# Patient Record
Sex: MALE | Race: WHITE | NOT HISPANIC OR LATINO | Employment: FULL TIME | URBAN - METROPOLITAN AREA
[De-identification: names, ages, dates, MRNs, and addresses within clinical notes are randomized per-mention and may not be internally consistent; named-entity substitution may affect disease eponyms.]

---

## 2019-01-21 ENCOUNTER — OFFICE VISIT (OUTPATIENT)
Dept: URGENT CARE | Facility: CLINIC | Age: 44
End: 2019-01-21
Payer: COMMERCIAL

## 2019-01-21 VITALS
HEIGHT: 74 IN | TEMPERATURE: 97 F | RESPIRATION RATE: 14 BRPM | WEIGHT: 239 LBS | BODY MASS INDEX: 30.67 KG/M2 | SYSTOLIC BLOOD PRESSURE: 142 MMHG | DIASTOLIC BLOOD PRESSURE: 99 MMHG | HEART RATE: 83 BPM | OXYGEN SATURATION: 100 %

## 2019-01-21 DIAGNOSIS — R53.83 FATIGUE, UNSPECIFIED TYPE: Primary | ICD-10-CM

## 2019-01-21 LAB
CTP QC/QA: YES
FLUAV AG NPH QL: NEGATIVE
FLUBV AG NPH QL: NEGATIVE

## 2019-01-21 PROCEDURE — 87804 POCT INFLUENZA A/B: ICD-10-PCS | Mod: 59,QW,, | Performed by: NURSE PRACTITIONER

## 2019-01-21 PROCEDURE — 99204 OFFICE O/P NEW MOD 45 MIN: CPT | Mod: 25,S$GLB,, | Performed by: NURSE PRACTITIONER

## 2019-01-21 PROCEDURE — 99204 PR OFFICE/OUTPT VISIT, NEW, LEVL IV, 45-59 MIN: ICD-10-PCS | Mod: 25,S$GLB,, | Performed by: NURSE PRACTITIONER

## 2019-01-21 PROCEDURE — 96372 THER/PROPH/DIAG INJ SC/IM: CPT | Mod: S$GLB,,, | Performed by: NURSE PRACTITIONER

## 2019-01-21 PROCEDURE — 87804 INFLUENZA ASSAY W/OPTIC: CPT | Mod: QW,,, | Performed by: NURSE PRACTITIONER

## 2019-01-21 PROCEDURE — 96372 PR INJECTION,THERAP/PROPH/DIAG2ST, IM OR SUBCUT: ICD-10-PCS | Mod: S$GLB,,, | Performed by: NURSE PRACTITIONER

## 2019-01-21 RX ORDER — CETIRIZINE HYDROCHLORIDE 10 MG/1
10 TABLET ORAL DAILY
Qty: 30 TABLET | Refills: 2 | Status: SHIPPED | OUTPATIENT
Start: 2019-01-21 | End: 2020-09-09

## 2019-01-21 RX ORDER — FLUTICASONE PROPIONATE 50 MCG
2 SPRAY, SUSPENSION (ML) NASAL 2 TIMES DAILY
Qty: 1 BOTTLE | Refills: 0 | Status: SHIPPED | OUTPATIENT
Start: 2019-01-21 | End: 2019-03-06

## 2019-01-21 RX ORDER — DEXAMETHASONE SODIUM PHOSPHATE 4 MG/ML
8 INJECTION, SOLUTION INTRA-ARTICULAR; INTRALESIONAL; INTRAMUSCULAR; INTRAVENOUS; SOFT TISSUE
Status: COMPLETED | OUTPATIENT
Start: 2019-01-21 | End: 2019-01-21

## 2019-01-21 RX ADMIN — DEXAMETHASONE SODIUM PHOSPHATE 8 MG: 4 INJECTION, SOLUTION INTRA-ARTICULAR; INTRALESIONAL; INTRAMUSCULAR; INTRAVENOUS; SOFT TISSUE at 05:01

## 2019-01-21 NOTE — PATIENT INSTRUCTIONS
Symptomatic treatment:    Alternate Tylenol and Ibuprofen every 3 hrs  salt water gargles to soothe throat  Honey/lemon water to soothe throat  Cold-eeze helps to reduce the duration of URI symptoms  Elderberry to reduce duration of URI symptoms  Cepachol helps to numb the discomfort in throat  Nasal saline spray reduces inflammation and dryness  Warm face compresses/hot showers as often as you can to open sinuses   Vicks vapor rub at night  Flonase OTC or Nasacort OTC  Simple foods like chicken noodle soup help hydrate  Delsym helps with coughing at night  Zyrtec/Claritin during the day and Benadryl at night may help if allergy component   Zantac will help if there is reflux from the post nasal drip  Rest as much as you can  Your symptoms will likely last 5-7 days, maybe longer depending on how it affects your body.  You are contagious 5-7, so minimize contact with others to reduce the spread to others and stay home from work or school as we discussed. Dehydration is preventable but is one of the main reasons why you will feel so badly. Drink pedialyte, gatorade or propel. Stay hydrated.  Antibiotics are not needed unless a complication(such as Otitis Media, Bacterial sinus infection or pneumonia). Taking antibiotics for Flu/Cold is not supported by evidencebased medicine and can expose you to unnecessary side effects of the medication, such as anaphylaxis.   If you experience any:  Chest pain, shortness of breath, wheezing or difficulty breathing  Severe headache, face, neck or ear pain  New rash  Fever over 101.5º F (38.6 C) for more than three days  Confusion, behavior change or seizure  Severe weakness or dizziness  Go to ER        Managing Fatigue     Family members can help with meals and chores around the house.   Fatigue is common. It can be caused by worry, lack of sleep, or poor appetite. Fatigue can also be a sign of anemia, a shortage of red blood cells. You might need medical treatment for anemia. The  tips below can help you feel better.  Conserving energy  · Keep track of the times of day when you are most tired and plan around them. For instance, if you are more tired in the afternoon, try to get tasks done in the morning.  · Decide which tasks are most important. Do those first.  · Pass tasks along to others when you need to. Ask for help.  · Accept help when its offered. Tell people what they can do to help. For instance, you may need someone to fix a meal, fold clothes, or put gas in your car.  · Plan rest times. You may want to take a nap each day. Just sitting quietly for a few minutes can make you feel more rested.  What you can do to feel better  · Relax before you try to sleep. Take a bath or read for a while.  · Form a sleep pattern. Go to bed at the same time each night and get up at the same time each morning.  · Eat well. Choose foods from all of the food groups each day.  · Exercise. Take a brisk walk to help increase your energy.  · Avoid caffeine and alcohol. Drink plenty of water or fruit juices instead.  Treating anemia  If you begin to feel more tired than normal, tell your doctor. Fatigue could be a sign of anemia. This problem is fairly common in cancer patients, especially during chemotherapy and radiation treatments. If your red blood cell count is too low, you may get a blood transfusion. In some cases, you may need medicine to increase the number of red blood cells your body makes.  When to call your healthcare provider  Call your healthcare provider if you have:  · Shortness of breath or chest pain  · A dizzy feeling when you get up from lying or sitting down  · Paler skin than normal  · Extreme tiredness that is not helped by sleep   Date Last Reviewed: 1/3/2016  © 4882-9665 [employer]. [address] [place] [address], [place] [address]. All rights reserved. This information is not intended as a substitute for professional medical care. Always follow your healthcare  professional's instructions.        Viral Upper Respiratory Illness (Adult)  You have a viral upper respiratory illness (URI), which is another term for the common cold. This illness is contagious during the first few days. It is spread through the air by coughing and sneezing. It may also be spread by direct contact (touching the sick person and then touching your own eyes, nose, or mouth). Frequent handwashing will decrease risk of spread. Most viral illnesses go away within 7 to 10 days with rest and simple home remedies. Sometimes the illness may last for several weeks. Antibiotics will not kill a virus, and they are generally not prescribed for this condition.    Home care  · If symptoms are severe, rest at home for the first 2 to 3 days. When you resume activity, don't let yourself get too tired.  · Avoid being exposed to cigarette smoke (yours or others).  · You may use acetaminophen or ibuprofen to control pain and fever, unless another medicine was prescribed. (Note: If you have chronic liver or kidney disease, have ever had a stomach ulcer or gastrointestinal bleeding, or are taking blood-thinning medicines, talk with your healthcare provider before using these medicines.) Aspirin should never be given to anyone under 18 years of age who is ill with a viral infection or fever. It may cause severe liver or brain damage.  · Your appetite may be poor, so a light diet is fine. Avoid dehydration by drinking 6 to 8 glasses of fluids per day (water, soft drinks, juices, tea, or soup). Extra fluids will help loosen secretions in the nose and lungs.  · Over-the-counter cold medicines will not shorten the length of time youre sick, but they may be helpful for the following symptoms: cough, sore throat, and nasal and sinus congestion. (Note: Do not use decongestants if you have high blood pressure.)  Follow-up care  Follow up with your healthcare provider, or as advised.  When to seek medical advice  Call your  healthcare provider right away if any of these occur:  · Cough with lots of colored sputum (mucus)  · Severe headache; face, neck, or ear pain  · Difficulty swallowing due to throat pain  · Fever of 100.4°F (38°C)  Call 911, or get immediate medical care  Call emergency services right away if any of these occur:  · Chest pain, shortness of breath, wheezing, or difficulty breathing  · Coughing up blood  · Inability to swallow due to throat pain  Date Last Reviewed: 9/13/2015 © 2000-2017 [employer]. [address] [place] [id number]. All rights reserved. This information is not intended as a substitute for professional medical care. Always follow your healthcare professional's instructions.

## 2019-01-21 NOTE — PROGRESS NOTES
"Subjective:       Patient ID: Lucas Figueroa is a 43 y.o. male.    Vitals:  height is 6' 2" (1.88 m) and weight is 108.4 kg (239 lb). His temperature is 97.2 °F (36.2 °C). His blood pressure is 142/99 (abnormal) and his pulse is 83. His respiration is 14 and oxygen saturation is 100%.     Chief Complaint: Fatigue    Fatigue   The current episode started yesterday. Associated symptoms include congestion, coughing, fatigue and headaches. Pertinent negatives include no arthralgias, chest pain, chills, fever, joint swelling, myalgias, nausea, rash, sore throat, vertigo or vomiting. Associated symptoms comments: Sinus pressure , nausea, loss of appeitie .       Constitution: Positive for fatigue. Negative for chills and fever.   HENT: Positive for congestion. Negative for sore throat.    Neck: Negative for painful lymph nodes.   Cardiovascular: Negative for chest pain and leg swelling.   Eyes: Negative for double vision and blurred vision.   Respiratory: Positive for cough. Negative for shortness of breath.    Gastrointestinal: Negative for nausea, vomiting and diarrhea.   Genitourinary: Negative for dysuria, frequency and urgency.   Musculoskeletal: Negative for joint pain, joint swelling, muscle cramps and muscle ache.   Skin: Negative for color change, pale and rash.   Allergic/Immunologic: Negative for seasonal allergies.   Neurological: Positive for headaches. Negative for dizziness, history of vertigo, light-headedness and passing out.   Hematologic/Lymphatic: Negative for swollen lymph nodes, easy bruising/bleeding and history of blood clots. Does not bruise/bleed easily.   Psychiatric/Behavioral: Negative for nervous/anxious, sleep disturbance and depression. The patient is not nervous/anxious.        Objective:      Physical Exam   Constitutional: He is oriented to person, place, and time. He appears well-developed and well-nourished. He is active and cooperative.   HENT:   Head: Normocephalic and " atraumatic.   Right Ear: Hearing, external ear and ear canal normal. A middle ear effusion is present.   Left Ear: Hearing, external ear and ear canal normal. A middle ear effusion is present.   Nose: Mucosal edema and rhinorrhea present. Right sinus exhibits maxillary sinus tenderness. Left sinus exhibits maxillary sinus tenderness.   Mouth/Throat: Uvula is midline and mucous membranes are normal. Posterior oropharyngeal erythema present.   Eyes: Conjunctivae, EOM and lids are normal. Pupils are equal, round, and reactive to light.   Neck: Trachea normal, normal range of motion and phonation normal. Neck supple.   Cardiovascular: Normal rate, regular rhythm, normal heart sounds, intact distal pulses and normal pulses.   Pulmonary/Chest: Effort normal and breath sounds normal.   Abdominal: Soft. Bowel sounds are normal.   Musculoskeletal: Normal range of motion.   Neurological: He is alert and oriented to person, place, and time. He has normal strength. GCS eye subscore is 4. GCS verbal subscore is 5. GCS motor subscore is 6.   Skin: Skin is warm, dry and intact.   Psychiatric: He has a normal mood and affect. His behavior is normal. Judgment and thought content normal.   Nursing note and vitals reviewed.      Assessment:       1. Fatigue, unspecified type        Plan:     negative influenza, but with symptoms, will treat for flu    Fatigue, unspecified type  -     POCT Influenza A/B    Other orders  -     dexamethasone injection 8 mg  -     cetirizine (ZYRTEC) 10 MG tablet; Take 1 tablet (10 mg total) by mouth once daily.  Dispense: 30 tablet; Refill: 2  -     fluticasone (FLONASE) 50 mcg/actuation nasal spray; 2 sprays (100 mcg total) by Each Nare route 2 (two) times daily.  Dispense: 1 Bottle; Refill: 0  -     baloxavir marboxil (XOFLUZA) 40 mg Tab; Take 80 mg by mouth once. for 1 dose  Dispense: 2 tablet; Refill: 0

## 2019-02-04 ENCOUNTER — OFFICE VISIT (OUTPATIENT)
Dept: ORTHOPEDICS | Facility: CLINIC | Age: 44
End: 2019-02-04
Payer: COMMERCIAL

## 2019-02-04 VITALS
BODY MASS INDEX: 29.52 KG/M2 | HEIGHT: 74 IN | WEIGHT: 230 LBS | HEART RATE: 80 BPM | DIASTOLIC BLOOD PRESSURE: 86 MMHG | SYSTOLIC BLOOD PRESSURE: 140 MMHG

## 2019-02-04 DIAGNOSIS — M79.631 RIGHT FOREARM PAIN: Primary | ICD-10-CM

## 2019-02-04 DIAGNOSIS — R29.898 WEAKNESS OF RIGHT UPPER EXTREMITY: ICD-10-CM

## 2019-02-04 PROCEDURE — 99203 PR OFFICE/OUTPT VISIT, NEW, LEVL III, 30-44 MIN: ICD-10-PCS | Mod: ,,, | Performed by: ORTHOPAEDIC SURGERY

## 2019-02-04 PROCEDURE — 99203 OFFICE O/P NEW LOW 30 MIN: CPT | Mod: ,,, | Performed by: ORTHOPAEDIC SURGERY

## 2019-02-04 RX ORDER — LISDEXAMFETAMINE DIMESYLATE 60 MG/1
CAPSULE ORAL
Refills: 0 | COMMUNITY
Start: 2019-01-06 | End: 2019-03-06

## 2019-02-04 NOTE — PROGRESS NOTES
[facility]    Subjective:     Chief Complaint:   Chief Complaint   Patient presents with    Right Wrist - Pain     Right wrist pain x a while. States that his pain has gotten worse and states that it is weak and states that if he does certain things with it, he will get pain. Pain comes and goes.        History reviewed. No pertinent past medical history.    Past Surgical History:   Procedure Laterality Date    HERNIA REPAIR         Current Outpatient Medications   Medication Sig    cetirizine (ZYRTEC) 10 MG tablet Take 1 tablet (10 mg total) by mouth once daily.    fluticasone (FLONASE) 50 mcg/actuation nasal spray 2 sprays (100 mcg total) by Each Nare route 2 (two) times daily.    VYVANSE 60 mg capsule TK 1 C PO QAM     No current facility-administered medications for this visit.        Review of patient's allergies indicates:  No Known Allergies    Family History   Problem Relation Age of Onset    No Known Problems Mother     No Known Problems Father        Social History     Socioeconomic History    Marital status: Single     Spouse name: Not on file    Number of children: Not on file    Years of education: Not on file    Highest education level: Not on file   Social Needs    Financial resource strain: Not on file    Food insecurity - worry: Not on file    Food insecurity - inability: Not on file    Transportation needs - medical: Not on file    Transportation needs - non-medical: Not on file   Occupational History    Not on file   Tobacco Use    Smoking status: Never Smoker   Substance and Sexual Activity    Alcohol use: Not on file    Drug use: Not on file    Sexual activity: Not on file   Other Topics Concern    Not on file   Social History Narrative    Not on file       History of present illness: This man 43 cuts him in usual right forearm pain. He has some vague aching in the arm does not have particularly numbness. Does not member any particular injury. 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 Never had a fracture etc. Does not complain of typical night pain like carpal tunnel. He does note some stiffness in the right arm compared to the left. He works at a desk and with the computer mouse he notes some discomfort in his right arm in that position with the arm pronated on the mouse.      Review of Systems:    Constitution: Negative for chills, fever, and sweats.  Negative for unexplained weight loss.    HENT:  Negative for headaches and blurry vision.    Cardiovascular:Negative for chest pain or irregular heart beat. Negative for hypertension.    Respiratory:  Negative for cough and shortness of breath.    Gastrointestinal: Negative for abdominal pain, heartburn, melena, nausea, and vomitting.    Genitourinary:  Negative bladder incontinence and dysuria.    Musculoskeletal:  See HPI for details.     Neurological: Negative for numbness.    Psychiatric/Behavioral: Negative for depression.  The patient is not nervous/anxious.      Endocrine: Negative for polyuria    Hematologic/Lymphatic: Negative for bleeding problem.  Does not bruise/bleed easily.    Skin: Negative for poor would healing and rash    Objective:      Physical Examination:    Vital Signs:    Vitals:    02/04/19 0839   BP: (!) 140/86   Pulse: 80       Body mass index is 29.53 kg/m².    This a well-developed, well nourished patient in no acute distress.  They are alert and oriented and cooperative to examination.        Nuzhta look at his right forearm he has minimal tenderness at the lateral epicondyle he is no tenderness at the medial epicondyle good range of motion flexion extension the elbow he does have some changes in his rotation. He lacks about 15-20° of supination and lacks at least 30° of pronation with some discomfort in his forearm with pronation.  1:30 left 110 right. He has no tenderness at the distal radioulnar joint he has no tenderness to indicate de Quervain's and or any particular wrist issue. Such as  ganglion. He has no atrophy of his hand he has no weakness in intrinsic in his right hand.  Pertinent New Results:    XRAY Report / Interpretation:   We have AP and lateral right forearm. The radius and ulna appear to have a normal contour I do not see any ossifications or other issues in the interosseous membrane. The elbow on AP and lateral appears to be unremarkable the wrist is unremarkable.Electronically Signed By Brett Salamanca JR, MD    Assessment/Plan:      So he has some kind of unusual issue with rotation of the forearm particularly with pronation of forearm, I plan to get an MRI of his right forearm. I don't see anything in interosseous membrane. Also want to get a nerve conduction EMG to see if is any issue with the nerves in his right forearm. Meanwhile he's going to continue his normal activities. I don't have a reason for a loss of pronation on x-ray so far. Return after those tests are done      This note was created using Dragon voice recognition software that occasionally misinterpreted phrases or words.

## 2019-02-18 ENCOUNTER — TELEPHONE (OUTPATIENT)
Dept: ORTHOPEDICS | Facility: CLINIC | Age: 44
End: 2019-02-18

## 2019-02-22 NOTE — TELEPHONE ENCOUNTER
Just noticed this message was sent directly to Dr. Salamanca, and not me.  Resubmitted the order to Dr. Ortega via inbox

## 2019-02-27 ENCOUNTER — TELEPHONE (OUTPATIENT)
Dept: ORTHOPEDICS | Facility: CLINIC | Age: 44
End: 2019-02-27

## 2019-02-27 NOTE — TELEPHONE ENCOUNTER
Dr Ortega's office called because this patient is coming in at 10 AM to get a test done, she needs a demo sheet and referral/order of what we want done. Please fax to [phone].

## 2019-03-06 ENCOUNTER — OFFICE VISIT (OUTPATIENT)
Dept: ORTHOPEDICS | Facility: CLINIC | Age: 44
End: 2019-03-06
Payer: COMMERCIAL

## 2019-03-06 VITALS
BODY MASS INDEX: 29.52 KG/M2 | HEIGHT: 74 IN | SYSTOLIC BLOOD PRESSURE: 138 MMHG | WEIGHT: 230 LBS | HEART RATE: 83 BPM | DIASTOLIC BLOOD PRESSURE: 88 MMHG

## 2019-03-06 DIAGNOSIS — R29.898 WEAKNESS OF RIGHT UPPER EXTREMITY: ICD-10-CM

## 2019-03-06 DIAGNOSIS — G56.21 TARDY ULNAR NERVE PALSY, RIGHT: ICD-10-CM

## 2019-03-06 DIAGNOSIS — M79.631 RIGHT FOREARM PAIN: Primary | ICD-10-CM

## 2019-03-06 PROCEDURE — 99213 PR OFFICE/OUTPT VISIT, EST, LEVL III, 20-29 MIN: ICD-10-PCS | Mod: ,,, | Performed by: ORTHOPAEDIC SURGERY

## 2019-03-06 PROCEDURE — 99213 OFFICE O/P EST LOW 20 MIN: CPT | Mod: ,,, | Performed by: ORTHOPAEDIC SURGERY

## 2019-03-06 RX ORDER — LISDEXAMFETAMINE DIMESYLATE 50 MG/1
CAPSULE ORAL
Refills: 0 | COMMUNITY
Start: 2019-02-10 | End: 2020-09-09 | Stop reason: SDUPTHER

## 2020-08-09 ENCOUNTER — HOSPITAL ENCOUNTER (EMERGENCY)
Facility: HOSPITAL | Age: 45
Discharge: HOME | End: 2020-08-10
Attending: EMERGENCY MEDICINE
Payer: COMMERCIAL

## 2020-08-09 DIAGNOSIS — M25.539 WRIST PAIN: ICD-10-CM

## 2020-08-09 DIAGNOSIS — M79.643 HAND PAIN: ICD-10-CM

## 2020-08-09 DIAGNOSIS — L03.113 CELLULITIS OF RIGHT UPPER EXTREMITY: Primary | ICD-10-CM

## 2020-08-09 PROCEDURE — 99284 EMERGENCY DEPT VISIT MOD MDM: CPT | Mod: 25

## 2020-08-10 VITALS
HEIGHT: 74 IN | BODY MASS INDEX: 28.23 KG/M2 | WEIGHT: 220 LBS | RESPIRATION RATE: 16 BRPM | HEART RATE: 76 BPM | OXYGEN SATURATION: 100 % | SYSTOLIC BLOOD PRESSURE: 148 MMHG | DIASTOLIC BLOOD PRESSURE: 94 MMHG | TEMPERATURE: 98 F

## 2020-08-10 LAB
ALBUMIN SERPL BCP-MCNC: 4.1 G/DL (ref 3.5–5.2)
ALP SERPL-CCNC: 74 U/L (ref 55–135)
ALT SERPL W/O P-5'-P-CCNC: 18 U/L (ref 10–44)
ANION GAP SERPL CALC-SCNC: 10 MMOL/L (ref 8–16)
AST SERPL-CCNC: 18 U/L (ref 10–40)
BASOPHILS # BLD AUTO: 0.04 K/UL (ref 0–0.2)
BASOPHILS NFR BLD: 0.5 % (ref 0–1.9)
BILIRUB SERPL-MCNC: 0.9 MG/DL (ref 0.1–1)
BUN SERPL-MCNC: 20 MG/DL (ref 6–20)
CALCIUM SERPL-MCNC: 8.9 MG/DL (ref 8.7–10.5)
CHLORIDE SERPL-SCNC: 105 MMOL/L (ref 95–110)
CK SERPL-CCNC: 161 U/L (ref 20–200)
CO2 SERPL-SCNC: 23 MMOL/L (ref 23–29)
CREAT SERPL-MCNC: 1 MG/DL (ref 0.5–1.4)
DIFFERENTIAL METHOD: ABNORMAL
EOSINOPHIL # BLD AUTO: 0.4 K/UL (ref 0–0.5)
EOSINOPHIL NFR BLD: 5 % (ref 0–8)
ERYTHROCYTE [DISTWIDTH] IN BLOOD BY AUTOMATED COUNT: 13.2 % (ref 11.5–14.5)
ERYTHROCYTE [SEDIMENTATION RATE] IN BLOOD BY WESTERGREN METHOD: 28 MM/HR (ref 0–10)
EST. GFR  (AFRICAN AMERICAN): >60 ML/MIN/1.73 M^2
EST. GFR  (NON AFRICAN AMERICAN): >60 ML/MIN/1.73 M^2
GLUCOSE SERPL-MCNC: 100 MG/DL (ref 70–110)
HCT VFR BLD AUTO: 36.1 % (ref 40–54)
HGB BLD-MCNC: 12.6 G/DL (ref 14–18)
IMM GRANULOCYTES # BLD AUTO: 0.01 K/UL (ref 0–0.04)
IMM GRANULOCYTES NFR BLD AUTO: 0.1 % (ref 0–0.5)
LACTATE SERPL-SCNC: 0.9 MMOL/L (ref 0.5–1.9)
LYMPHOCYTES # BLD AUTO: 2 K/UL (ref 1–4.8)
LYMPHOCYTES NFR BLD: 26.2 % (ref 18–48)
MCH RBC QN AUTO: 30.4 PG (ref 27–31)
MCHC RBC AUTO-ENTMCNC: 34.9 G/DL (ref 32–36)
MCV RBC AUTO: 87 FL (ref 82–98)
MONOCYTES # BLD AUTO: 0.9 K/UL (ref 0.3–1)
MONOCYTES NFR BLD: 11.4 % (ref 4–15)
NEUTROPHILS # BLD AUTO: 4.3 K/UL (ref 1.8–7.7)
NEUTROPHILS NFR BLD: 56.8 % (ref 38–73)
NRBC BLD-RTO: 0 /100 WBC
PLATELET # BLD AUTO: 284 K/UL (ref 150–350)
PMV BLD AUTO: 9.3 FL (ref 9.2–12.9)
POTASSIUM SERPL-SCNC: 3.7 MMOL/L (ref 3.5–5.1)
PROT SERPL-MCNC: 7 G/DL (ref 6–8.4)
RBC # BLD AUTO: 4.14 M/UL (ref 4.6–6.2)
SODIUM SERPL-SCNC: 138 MMOL/L (ref 136–145)
WBC # BLD AUTO: 7.62 K/UL (ref 3.9–12.7)

## 2020-08-10 PROCEDURE — 85651 RBC SED RATE NONAUTOMATED: CPT

## 2020-08-10 PROCEDURE — 83605 ASSAY OF LACTIC ACID: CPT

## 2020-08-10 PROCEDURE — 82550 ASSAY OF CK (CPK): CPT

## 2020-08-10 PROCEDURE — 36415 COLL VENOUS BLD VENIPUNCTURE: CPT

## 2020-08-10 PROCEDURE — 25000003 PHARM REV CODE 250: Performed by: EMERGENCY MEDICINE

## 2020-08-10 PROCEDURE — 80053 COMPREHEN METABOLIC PANEL: CPT

## 2020-08-10 PROCEDURE — 85025 COMPLETE CBC W/AUTO DIFF WBC: CPT

## 2020-08-10 RX ORDER — DOXYCYCLINE 100 MG/1
100 CAPSULE ORAL 2 TIMES DAILY
Qty: 20 CAPSULE | Refills: 0 | Status: SHIPPED | OUTPATIENT
Start: 2020-08-10 | End: 2020-08-20

## 2020-08-10 RX ORDER — IBUPROFEN 600 MG/1
600 TABLET ORAL EVERY 6 HOURS PRN
Qty: 20 TABLET | Refills: 0 | Status: SHIPPED | OUTPATIENT
Start: 2020-08-10 | End: 2022-03-11

## 2020-08-10 RX ORDER — DOXYCYCLINE 100 MG/1
100 CAPSULE ORAL EVERY 12 HOURS
Status: DISCONTINUED | OUTPATIENT
Start: 2020-08-10 | End: 2020-08-10 | Stop reason: HOSPADM

## 2020-08-10 RX ADMIN — IBUPROFEN 600 MG: 400 TABLET ORAL at 01:08

## 2020-08-10 RX ADMIN — DOXYCYCLINE HYCLATE 100 MG: 100 CAPSULE ORAL at 01:08

## 2020-08-10 NOTE — ED PROVIDER NOTES
Encounter Date: 8/9/2020       History     Chief Complaint   Patient presents with    Wrist Injury     right     44-year-old male with no significant past medical history presents with right wrist pain and swelling.  Patient states he has had episodes like this similar in the past that have resolved in her home.  Patient denies any recent traumas and states that he decided to come to emergency room because of the increased swelling and decreased range of motion that he has been having.  Patient denies any loss of sensation, fevers, chills, sweats, nausea, vomiting, chest pain or shortness of breath.  He is otherwise stable and has no other complaints.        Review of patient's allergies indicates:  No Known Allergies  No past medical history on file.  Past Surgical History:   Procedure Laterality Date    HERNIA REPAIR       Family History   Problem Relation Age of Onset    No Known Problems Mother     No Known Problems Father      Social History     Tobacco Use    Smoking status: Never Smoker   Substance Use Topics    Alcohol use: Not on file    Drug use: Not on file     Review of Systems   Constitutional: Negative for fever.   HENT: Negative for sore throat.    Respiratory: Negative for shortness of breath.    Cardiovascular: Negative for chest pain.   Gastrointestinal: Negative for nausea.   Genitourinary: Negative for dysuria.   Musculoskeletal: Positive for joint swelling (Right wrist). Negative for back pain.   Skin: Negative for rash.   Neurological: Negative for weakness.   Hematological: Does not bruise/bleed easily.   All other systems reviewed and are negative.      Physical Exam     Initial Vitals [08/09/20 2240]   BP Pulse Resp Temp SpO2   (!) 160/106 84 16 97.6 °F (36.4 °C) 98 %      MAP       --         Physical Exam    Nursing note and vitals reviewed.  Constitutional: He appears well-developed and well-nourished. He is not diaphoretic. He appears distressed.   HENT:   Head: Normocephalic and  atraumatic.   Mouth/Throat: Oropharynx is clear and moist.   Eyes: Conjunctivae and EOM are normal. Pupils are equal, round, and reactive to light.   Neck: Normal range of motion. Neck supple. No thyromegaly present. No JVD present.   Cardiovascular: Normal rate, regular rhythm and normal heart sounds. Exam reveals no gallop and no friction rub.    No murmur heard.  Pulmonary/Chest: Breath sounds normal. No respiratory distress. He has no wheezes. He exhibits no tenderness.   Abdominal: Soft. Bowel sounds are normal. He exhibits no distension. There is no abdominal tenderness. There is no rebound and no guarding.   Musculoskeletal: Tenderness and edema present.      Right wrist: He exhibits decreased range of motion and swelling.   Neurological: He is alert and oriented to person, place, and time. He has normal strength. No cranial nerve deficit or sensory deficit.   Skin: Skin is warm and dry. Capillary refill takes less than 2 seconds. No rash noted. No erythema.   Psychiatric: He has a normal mood and affect.         ED Course   Procedures  Labs Reviewed   CBC W/ AUTO DIFFERENTIAL - Abnormal; Notable for the following components:       Result Value    RBC 4.14 (*)     Hemoglobin 12.6 (*)     Hematocrit 36.1 (*)     All other components within normal limits   CK   LACTIC ACID, PLASMA   COMPREHENSIVE METABOLIC PANEL   SEDIMENTATION RATE          Imaging Results          X-Ray Wrist Complete Right (In process)                X-Ray Hand 3 View Right (In process)  Result time 08/09/20 23:18:00   Procedure changed from X-Ray Hand 3 View Left                X-Ray Forearm Right (In process)  Result time 08/09/20 23:17:24   Procedure changed from X-Ray Forearm Left                  Medical Decision Making:   Initial Assessment:   [age] year male initial assessment in mild distress secondary to right wrist swelling and pain.  Patient is alert and oriented x3, neurologically and neurovascularly intact with no focal  deficits.  He is nontoxic appearing and is vitals are stable at this time.  Differential Diagnosis:   Cellulitis, abscess, septic joint, tenosynovitis  Independently Interpreted Test(s):   I have ordered and independently interpreted X-rays - see prior notes.  Clinical Tests:   Lab Tests: Ordered and Reviewed  The following lab test(s) were unremarkable: CBC and CMP  Radiological Study: Ordered and Reviewed  ED Management:  Patient has been reassessed noted to have no acute changes condition.  Images and inflammatory markers as well as other lab work are negative for any acute pathology requiring further hospital admission or treatment at this time.  Patient given ibuprofen as well as started on doxycycline for what appears to be in acute cellulitis of the affected area.  Patient has remained stable while in the ED we discharged home stable condition with return precautions noted.  Mr. Figueroa is aware of the plan and in agreement with discharge.                                 Clinical Impression:       ICD-10-CM ICD-9-CM   1. Cellulitis of right upper extremity  L03.113 682.3   2. Wrist pain  M25.539 719.43   3. Hand pain  M79.643 729.5             ED Disposition Condition    Discharge Stable        ED Prescriptions     Medication Sig Dispense Start Date End Date Auth. Provider    ibuprofen (ADVIL,MOTRIN) 600 MG tablet Take 1 tablet (600 mg total) by mouth every 6 (six) hours as needed. 20 tablet 8/10/2020  William Friedman MD    doxycycline (VIBRAMYCIN) 100 MG Cap Take 1 capsule (100 mg total) by mouth 2 (two) times daily. for 10 days 20 capsule 8/10/2020 8/20/2020 William Friedman MD        Follow-up Information     Follow up With Specialties Details Why Contact Info Additional Information    [facility] Emergency Medicine  As needed, For wound re-check [address] [facility] [id number] [phone] 1st floor    William Plaza MD Family Medicine Schedule an appointment as soon as  possible for a visit in 1 week For wound re-check [address] [facility]  [address]  [facility] [address]  [phone]                                        William Friedman MD  08/10/20 0144

## 2020-09-09 ENCOUNTER — OFFICE VISIT (OUTPATIENT)
Dept: FAMILY MEDICINE | Facility: CLINIC | Age: 45
End: 2020-09-09
Payer: COMMERCIAL

## 2020-09-09 VITALS
OXYGEN SATURATION: 97 % | HEIGHT: 74 IN | HEART RATE: 81 BPM | RESPIRATION RATE: 16 BRPM | BODY MASS INDEX: 29.76 KG/M2 | TEMPERATURE: 97 F | SYSTOLIC BLOOD PRESSURE: 142 MMHG | WEIGHT: 231.88 LBS | DIASTOLIC BLOOD PRESSURE: 96 MMHG

## 2020-09-09 DIAGNOSIS — Z11.4 SCREENING FOR HIV WITHOUT PRESENCE OF RISK FACTORS: ICD-10-CM

## 2020-09-09 DIAGNOSIS — Z11.59 ENCOUNTER FOR HEPATITIS C SCREENING TEST FOR LOW RISK PATIENT: ICD-10-CM

## 2020-09-09 DIAGNOSIS — Z13.220 ENCOUNTER FOR LIPID SCREENING FOR CARDIOVASCULAR DISEASE: ICD-10-CM

## 2020-09-09 DIAGNOSIS — Z13.6 ENCOUNTER FOR LIPID SCREENING FOR CARDIOVASCULAR DISEASE: ICD-10-CM

## 2020-09-09 DIAGNOSIS — Z13.1 DIABETES MELLITUS SCREENING: ICD-10-CM

## 2020-09-09 DIAGNOSIS — I10 ESSENTIAL HYPERTENSION: Primary | ICD-10-CM

## 2020-09-09 DIAGNOSIS — F98.8 ATTENTION DEFICIT DISORDER (ADD) WITHOUT HYPERACTIVITY: ICD-10-CM

## 2020-09-09 PROCEDURE — 99203 PR OFFICE/OUTPT VISIT, NEW, LEVL III, 30-44 MIN: ICD-10-PCS | Mod: S$GLB,,, | Performed by: FAMILY MEDICINE

## 2020-09-09 PROCEDURE — 99203 OFFICE O/P NEW LOW 30 MIN: CPT | Mod: S$GLB,,, | Performed by: FAMILY MEDICINE

## 2020-09-09 RX ORDER — LISINOPRIL AND HYDROCHLOROTHIAZIDE 10; 12.5 MG/1; MG/1
1 TABLET ORAL DAILY
Qty: 90 TABLET | Refills: 3 | Status: SHIPPED | OUTPATIENT
Start: 2020-09-09 | End: 2020-12-10 | Stop reason: ALTCHOICE

## 2020-09-09 RX ORDER — LISDEXAMFETAMINE DIMESYLATE 50 MG/1
CAPSULE ORAL
Qty: 90 CAPSULE | Refills: 0 | Status: SHIPPED | OUTPATIENT
Start: 2020-09-09 | End: 2020-12-10 | Stop reason: SDUPTHER

## 2020-09-09 NOTE — PROGRESS NOTES
"  SUBJECTIVE:    Patient ID: Lucas Figueroa is a 44 y.o. male.    Chief Complaint: Hypertension      45 yo male new to this provider here pt to establish care, his blood pressure is elevated but he is not on medications 150 systolic, he has been getting treatment for hyperlipidemia for the pasy 3 years and has been following up with his PCM every 3 months for refills.    SPMHX:  Allergic Rhinitis: Zyrtec 10mg  ADD: Vyvanse 50mg    Specialists:  Ortho: Dr Salamanca  Hand:  Dr Paulino    Smoke: None  ETOH: 5 a week  Exercise: Crossfit 4 days a week      HPI     ADHD Adult: On medications  Have difficulty sustaining attention in tasks or fun activities?  yes -   Don't follow through on instructions and fail to finish work?  yes -   Have difficulty organizing tasks and activities?  yes -   Avoid, dislike, or are reluctant to engage in work thar requires sustained mental effort?  yes -   Easily distracted?  yes -   Forgetful in daily activities?  yes -   Fidget with hands or feet, or squirm in seat?  no  Have difficulty engaging in leisure activities or doing fun things quietly?  Yes  Feel "on the go" or "driven by a motor"?  no  Blurt out answers before questions have been completed?  no  Have difficulty waiting your turn, are impatient?  no  Interrupt or intrude on others?  no      Past Medical History:   Diagnosis Date    ADHD (attention deficit hyperactivity disorder)     Hypertension      Social History     Socioeconomic History    Marital status:      Spouse name: Not on file    Number of children: Not on file    Years of education: Not on file    Highest education level: Not on file   Occupational History    Not on file   Social Needs    Financial resource strain: Not on file    Food insecurity     Worry: Not on file     Inability: Not on file    Transportation needs     Medical: Not on file     Non-medical: Not on file   Tobacco Use    Smoking status: Never Smoker    Smokeless tobacco: Never Used "   Substance and Sexual Activity    Alcohol use: Yes     Comment: occ    Drug use: Never    Sexual activity: Yes     Partners: Female   Lifestyle    Physical activity     Days per week: Not on file     Minutes per session: Not on file    Stress: Only a little   Relationships    Social connections     Talks on phone: Not on file     Gets together: Not on file     Attends Church service: Not on file     Active member of club or organization: Not on file     Attends meetings of clubs or organizations: Not on file     Relationship status: Not on file   Other Topics Concern    Not on file   Social History Narrative    Not on file     Past Surgical History:   Procedure Laterality Date    HERNIA REPAIR       Family History   Problem Relation Age of Onset    No Known Problems Mother     No Known Problems Father      Current Outpatient Medications   Medication Sig Dispense Refill    ibuprofen (ADVIL,MOTRIN) 600 MG tablet Take 1 tablet (600 mg total) by mouth every 6 (six) hours as needed. 20 tablet 0    VYVANSE 50 mg capsule TK ONE C PO  QAM 90 capsule 0    lisinopriL-hydrochlorothiazide (PRINZIDE,ZESTORETIC) 10-12.5 mg per tablet Take 1 tablet by mouth once daily. 90 tablet 3     No current facility-administered medications for this visit.      Review of patient's allergies indicates:  No Known Allergies    Review of Systems   Constitutional: Negative for activity change, appetite change, fatigue and fever.   HENT: Negative for congestion, ear pain, hearing loss, postnasal drip, sinus pressure, sinus pain, sneezing and sore throat.    Eyes: Negative for photophobia and pain.   Respiratory: Negative for cough, chest tightness, shortness of breath and wheezing.    Cardiovascular: Negative for chest pain and leg swelling.   Gastrointestinal: Negative for abdominal distention, abdominal pain, blood in stool, constipation, diarrhea, nausea and vomiting.   Endocrine: Negative for cold intolerance, heat  "intolerance, polydipsia and polyuria.   Genitourinary: Negative for difficulty urinating, dysuria, flank pain, frequency, hematuria and urgency.   Musculoskeletal: Positive for arthralgias (right wrist pain). Negative for back pain, joint swelling, myalgias and neck pain.   Skin: Negative for pallor and rash.   Allergic/Immunologic: Negative for environmental allergies and food allergies.   Neurological: Negative for dizziness, weakness, light-headedness and headaches.   Hematological: Does not bruise/bleed easily.   Psychiatric/Behavioral: Negative for confusion, decreased concentration and sleep disturbance. The patient is not nervous/anxious.           Blood pressure (!) 142/96, pulse 81, temperature 97.4 °F (36.3 °C), resp. rate 16, height 6' 2" (1.88 m), weight 105.2 kg (231 lb 14.4 oz), SpO2 97 %. Body mass index is 29.77 kg/m².   Objective:      Physical Exam  Constitutional:       General: He is not in acute distress.     Appearance: Normal appearance. He is well-developed. He is not ill-appearing or toxic-appearing.   HENT:      Head: Normocephalic and atraumatic.      Right Ear: External ear normal.      Left Ear: External ear normal.   Eyes:      General:         Right eye: No discharge.         Left eye: No discharge.      Conjunctiva/sclera: Conjunctivae normal.      Pupils: Pupils are equal, round, and reactive to light.   Cardiovascular:      Rate and Rhythm: Normal rate and regular rhythm.      Heart sounds: Normal heart sounds. No murmur.   Pulmonary:      Effort: Pulmonary effort is normal.      Breath sounds: Normal breath sounds.   Skin:     General: Skin is warm and dry.      Capillary Refill: Capillary refill takes less than 2 seconds.   Neurological:      Mental Status: He is alert and oriented to person, place, and time.   Psychiatric:         Mood and Affect: Mood normal.         Thought Content: Thought content normal.         Judgment: Judgment normal.             Assessment:       1. " Essential hypertension    2. Attention deficit disorder (ADD) without hyperactivity    3. Encounter for lipid screening for cardiovascular disease    4. Diabetes mellitus screening    5. Screening for HIV without presence of risk factors    6. Encounter for hepatitis C screening test for low risk patient         Plan:           Essential hypertension  -     lisinopriL-hydrochlorothiazide (PRINZIDE,ZESTORETIC) 10-12.5 mg per tablet; Take 1 tablet by mouth once daily.  Dispense: 90 tablet; Refill: 3  New diagnosis of HTN will start on medications and have him follow up in 3 month.Reduce the amount of salt in your diet; Lose weight; Avoid drinking too much alcohol; Exercise at least 30 minutes per day most days of the week.  Continue current medications and home BP monitoring. Start home BP monitoring. F/U if BP >140/90    Attention deficit disorder (ADD) without hyperactivity  -     VYVANSE 50 mg capsule; TK ONE C PO  QAM  Dispense: 90 capsule; Refill: 0  Will refill his medications and follow up in 3 months.    Encounter for lipid screening for cardiovascular disease  -     Lipid Panel; Future; Expected date: 09/09/2020    Diabetes mellitus screening  -     Comprehensive metabolic panel; Future; Expected date: 09/09/2020    Screening for HIV without presence of risk factors  -     HIV 1/2 Ag/Ab (4th Gen); Future; Expected date: 09/09/2020    Encounter for hepatitis C screening test for low risk patient  -     Hepatitis C Antibody; Future; Expected date: 09/09/2020

## 2020-12-03 DIAGNOSIS — M24.831: Primary | ICD-10-CM

## 2020-12-07 ENCOUNTER — CLINICAL SUPPORT (OUTPATIENT)
Dept: REHABILITATION | Facility: HOSPITAL | Age: 45
End: 2020-12-07
Payer: COMMERCIAL

## 2020-12-07 DIAGNOSIS — M24.831: ICD-10-CM

## 2020-12-07 DIAGNOSIS — R29.898 RIGHT HAND WEAKNESS: ICD-10-CM

## 2020-12-07 DIAGNOSIS — M25.441 EFFUSION OF JOINT OF RIGHT HAND: ICD-10-CM

## 2020-12-07 DIAGNOSIS — M25.641 STIFFNESS OF RIGHT HAND JOINT: ICD-10-CM

## 2020-12-07 DIAGNOSIS — M25.541 PAIN IN JOINT OF RIGHT HAND: Primary | ICD-10-CM

## 2020-12-07 PROCEDURE — 97110 THERAPEUTIC EXERCISES: CPT | Mod: PN

## 2020-12-07 PROCEDURE — 97165 OT EVAL LOW COMPLEX 30 MIN: CPT | Mod: PN

## 2020-12-07 NOTE — PATIENT INSTRUCTIONS
current home program 12-7-20  -wear brace when sleeping, out in public, and when walking  -use hand for very light painless nonrepetitive use only  -do below exercises 10 times, 6 x day      -keep digits loose for second exercise

## 2020-12-07 NOTE — PLAN OF CARE
[facility] Occupational Therapy  Initial Evaluation     Date: 12/7/2020  Name: Lucas Figueroa  Clinic Number: [id number]    Therapy Diagnosis:   Encounter Diagnoses   Name Primary?    Oth specific joint derangements of right wrist, NEC     Pain in joint of right hand Yes    Stiffness of right hand joint     Effusion of joint of right hand     Right hand weakness      Physician: Rayshawn Schultz III*    Physician Orders: evaluate and tx, see epic  Medical Diagnosis: right wrist partial scapholunate ligament tear, chronic TFCC tear  Surgical Procedure and Date: right wrist arthroscopy, scapholunate ligament ligament debridement, scapholunate laser ligamentoplasty, tfcc debridement; 11-16-20  Evaluation Date: 12/7/2020  Insurance Authorization Period Expiration: referral # [id number] 12-3-20 thru 12-3-21  Plan of Care Certification Period: 12-7-20 thru 1-4-20  Date of Return to MD: to be gathered    Visit # / Visits authorized: 1 / 1  Time In:715  Time Out: 745  Total Billable Time: 15 minutes    Precautions:  Universal, protocol  Subjective     Involved Side: right   Dominant Side: right  Date of Onset: years ago  History of Current Condition/Mechanism of Injury: insidious  Imaging: none in epic  Previous Therapy: none except issued exos wrist splint per patient pre surgery    Past Medical History/Physical Systems Review:   Lucas Figueroa  has a past medical history of ADHD (attention deficit hyperactivity disorder) and Hypertension.    Lucas Figueroa  has a past surgical history that includes Hernia repair.    Lucas has a current medication list which includes the following prescription(s): ibuprofen, lisinopril-hydrochlorothiazide, and vyvanse.    Review of patient's allergies indicates:  No Known Allergies     Patient's Goals for Therapy: full painless use    Pain:  Functional Pain Scale Rating 0-10:   0.5/10 on average  0/10 at best  1/10 at worst  Location: diffuse thru  wrist  Description: ache  Aggravating Factors: use  Easing Factors: rest  Occupation:  [employer]  Working presently: yes  Duties: per job; Normal self and home care tasks    Functional Limitations/Social History:    Previous functional status includes: Independent with all ADLs.     Current Functional Status   Home/Living environment : lives with family    Limitation of Functional Status as follows: decreased motion, use, and strength with all required tasks   ADLs/IADLs:               See above   Leisure: not tested  pain meds: denies  nicotine: denies  caffeine: 2 cups of regular coffee daily    Objective   Posture:healed portals per surgery  Palpation:nt  Sensation:denies burning, numbness, tingling  ROM: arom sup/pro 70/80, df/pf 40/52, rd/ud 20/40, all digits with full arom         Edema:circumferential     Wrist right 18.5 cm left 17.3 cm  DME:see above, reports in vehicle, did not bring into clinic          CMS Impairment/Limitation/Restriction for FOTO  Survey    Therapist reviewed FOTO scores for Lucas BASSETT [place] on 12/7/2020.   FOTO documents entered into [employer] - see Media section.    Limitation Score: 41%  Category: Carrying    Current : CK = at least 40% but < 60% impaired, limited or restricted  Goal: CJ = at least 20% but < 40% impaired, limited or restricted               Treatment     Treatment Time In: 730  Treatment Time Out: 745  Total Treatment time separate from Evaluation time:15      Lucas received therapeutic exercises for 15 minutes including:  -see above          Home Exercise Program/Education:  Issued HEP (see patient instructions in EMR) . Exercises were reviewed and Lucas was able to demonstrate them prior to the end of the session.   Pt received a written copy of exercises to perform at home. Lucas demonstrated good understanding of the education provided.  Pt was advised to perform these exercises free of pain, and to stop performing them if pain occurs.    Patient/Family  Education: role of OT, goals for OT, scheduling/cancellations - pt verbalized understanding. Discussed insurance limitations with patient.    Additional Education provided: likely tx progression, expectations of rehab    Assessment     Lucas Figueroa is a 45 y.o. male referred to outpatient occupational therapy and presents with a medical diagnosis of see above, resulting in Decreased ROM, Decreased muscle strength, Decreased functional hand use, Increased pain, Edema, Joint Stiffness and Scar Adhesions and demonstrates limitations as described in the chart below. Following medical record review it is determined that pt will benefit from occupational therapy services in order to maximize pain free and/or functional use of right hand. The following goals were discussed with the patient and patient is in agreement with them as to be addressed in the treatment plan. The patient's rehab potential is good.     Anticipated barriers to occupational therapy: edema, pain, stiffness, weakness, scar  Pt has no cultural, educational or language barriers to learning provided.    Profile and History Assessment of Occupational Performance Level of Clinical Decision Making Complexity Score   Occupational Profile:   Lucas Figueroa is a 45 y.o. male who lives with their family and is currently employed Lucas Figueroa has difficulty with  ADLs and IADLs as listed previously, which  affecting his/her daily functional abilities.      Comorbidities:    has a past medical history of ADHD (attention deficit hyperactivity disorder) and Hypertension.    Medical and Therapy History Review:   Brief               Performance Deficits    Physical:  Joint Mobility  Muscle Power/Strength  Skin Integrity/Scar Formation  Edema  Pain    Cognitive:  No Deficits    Psychosocial:    No Deficits     Clinical Decision Making:  low    Assessment Process:  Problem-Focused Assessments    Modification/Need for Assistance:  Not  Necessary    Intervention Selection:  Limited Treatment Options       low  Based on PMHX, co morbidities , data from assessments and functional level of assistance required with task and clinical presentation directly impacting function.           The following goals were discussed with the patient and patient is in agreement with them as to be addressed in the treatment plan.     Goals:   stg to be met in 3 weeks 1. Patient will be I with HEP 2. Patient will have 2/10 pain with light use 3. Patient will have 75% prom of right distal arm vs left to enhance affected arm use with ADL      ltg to be met by d/c 1. Patient will be I with d/c HEP 2. Patient will have 1/10 pain with all use 3. Patient will have about 75% /pinch  on affected side vs unaffected side to promote full functional use vs shoulder goal                                                                    4. Patient will be I with all ADL      all goals ongoing unless otherwise noted      Plan   Certification Period/Plan of care expiration: 12/7/2020 to 1-4-21    Outpatient Occupational Therapy 2 times weekly for 4 weeks to include the following interventions: eval and tx    Above frequency and duration in above dates may change based on patient progress and need for therapy    George ARROYO CHT

## 2020-12-10 ENCOUNTER — OFFICE VISIT (OUTPATIENT)
Dept: FAMILY MEDICINE | Facility: CLINIC | Age: 45
End: 2020-12-10
Payer: COMMERCIAL

## 2020-12-10 VITALS
HEART RATE: 85 BPM | BODY MASS INDEX: 29 KG/M2 | OXYGEN SATURATION: 99 % | RESPIRATION RATE: 16 BRPM | HEIGHT: 74 IN | SYSTOLIC BLOOD PRESSURE: 130 MMHG | DIASTOLIC BLOOD PRESSURE: 86 MMHG | WEIGHT: 226 LBS | TEMPERATURE: 98 F

## 2020-12-10 DIAGNOSIS — Z11.4 ENCOUNTER FOR SCREENING FOR HUMAN IMMUNODEFICIENCY VIRUS (HIV): ICD-10-CM

## 2020-12-10 DIAGNOSIS — Z13.1 DIABETES MELLITUS SCREENING: ICD-10-CM

## 2020-12-10 DIAGNOSIS — Z11.59 ENCOUNTER FOR HEPATITIS C SCREENING TEST FOR LOW RISK PATIENT: ICD-10-CM

## 2020-12-10 DIAGNOSIS — I10 ESSENTIAL HYPERTENSION: ICD-10-CM

## 2020-12-10 DIAGNOSIS — F98.8 ATTENTION DEFICIT DISORDER (ADD) WITHOUT HYPERACTIVITY: Primary | ICD-10-CM

## 2020-12-10 DIAGNOSIS — Z13.220 ENCOUNTER FOR LIPID SCREENING FOR CARDIOVASCULAR DISEASE: ICD-10-CM

## 2020-12-10 DIAGNOSIS — Z13.6 ENCOUNTER FOR LIPID SCREENING FOR CARDIOVASCULAR DISEASE: ICD-10-CM

## 2020-12-10 PROCEDURE — 99213 PR OFFICE/OUTPT VISIT, EST, LEVL III, 20-29 MIN: ICD-10-PCS | Mod: S$GLB,,, | Performed by: FAMILY MEDICINE

## 2020-12-10 PROCEDURE — 99213 OFFICE O/P EST LOW 20 MIN: CPT | Mod: S$GLB,,, | Performed by: FAMILY MEDICINE

## 2020-12-10 RX ORDER — LISINOPRIL AND HYDROCHLOROTHIAZIDE 20; 25 MG/1; MG/1
1 TABLET ORAL DAILY
Qty: 90 TABLET | Refills: 3 | Status: SHIPPED | OUTPATIENT
Start: 2020-12-10 | End: 2021-09-09 | Stop reason: ALTCHOICE

## 2020-12-10 RX ORDER — LISDEXAMFETAMINE DIMESYLATE 50 MG/1
CAPSULE ORAL
Qty: 90 CAPSULE | Refills: 0 | Status: SHIPPED | OUTPATIENT
Start: 2020-12-10 | End: 2021-03-10 | Stop reason: SDUPTHER

## 2020-12-10 NOTE — PROGRESS NOTES
"  SUBJECTIVE:    Patient ID: Lucas Figueroa is a 45 y.o. male.    Chief Complaint: ADD and Hypertension  43 yo male new to this provider here today to follow-up on his blood pressure and his ADD.  At last visit patient was started on lisinopril hydrochlorothiazide as blood pressures improved like to get it lower e has been getting treatment for hyperlipidemia for the pasy 3 years and has been following up with his PCM every 3 months for refills.     SPMHX:  Allergic Rhinitis: Zyrtec 10mg  ADD: Vyvanse 50mg  HTN: Started on Lisinopril/HCTZ 10/12.5 controlled.    Specialists:  Ortho: Dr Salamanca  Hand:  Dr Paulino     Smoke: None  ETOH: 5 a week  Exercise: Crossfit 4 days a week    Hypertension  This is a chronic problem. The current episode started more than 1 month ago. The problem is unchanged. The problem is controlled. Pertinent negatives include no anxiety, blurred vision, chest pain, headaches, malaise/fatigue, neck pain, orthopnea, palpitations, peripheral edema, PND, shortness of breath or sweats. Agents associated with hypertension include amphetamines. There are no known risk factors for coronary artery disease. Past treatments include ACE inhibitors and diuretics. The current treatment provides moderate improvement.       ADHD Adult: On medications  Have difficulty sustaining attention in tasks or fun activities?  yes -   Don't follow through on instructions and fail to finish work?  yes -   Have difficulty organizing tasks and activities?  yes -   Avoid, dislike, or are reluctant to engage in work thar requires sustained mental effort?  yes -   Easily distracted?  yes -   Forgetful in daily activities?  yes -   Fidget with hands or feet, or squirm in seat?  no  Have difficulty engaging in leisure activities or doing fun things quietly?  Yes  Feel "on the go" or "driven by a motor"?  no  Blurt out answers before questions have been completed?  no  Have difficulty waiting your turn, are impatient?  " no  Interrupt or intrude on others?  no  Past Medical History:   Diagnosis Date    ADHD (attention deficit hyperactivity disorder)     Hypertension      Social History     Socioeconomic History    Marital status:      Spouse name: Not on file    Number of children: Not on file    Years of education: Not on file    Highest education level: Not on file   Occupational History    Not on file   Social Needs    Financial resource strain: Not on file    Food insecurity     Worry: Not on file     Inability: Not on file    Transportation needs     Medical: Not on file     Non-medical: Not on file   Tobacco Use    Smoking status: Never Smoker    Smokeless tobacco: Never Used   Substance and Sexual Activity    Alcohol use: Yes     Comment: occ    Drug use: Never    Sexual activity: Yes     Partners: Female   Lifestyle    Physical activity     Days per week: Not on file     Minutes per session: Not on file    Stress: Only a little   Relationships    Social connections     Talks on phone: Not on file     Gets together: Not on file     Attends Scientologist service: Not on file     Active member of club or organization: Not on file     Attends meetings of clubs or organizations: Not on file     Relationship status: Not on file   Other Topics Concern    Not on file   Social History Narrative    Not on file     Past Surgical History:   Procedure Laterality Date    HERNIA REPAIR       Family History   Problem Relation Age of Onset    No Known Problems Mother     No Known Problems Father      Current Outpatient Medications   Medication Sig Dispense Refill    ibuprofen (ADVIL,MOTRIN) 600 MG tablet Take 1 tablet (600 mg total) by mouth every 6 (six) hours as needed. 20 tablet 0    VYVANSE 50 mg capsule TK ONE C PO  QAM 90 capsule 0    lisinopriL-hydrochlorothiazide (PRINZIDE,ZESTORETIC) 20-25 mg Tab Take 1 tablet by mouth once daily. 90 tablet 3     No current facility-administered medications for this  "visit.      Review of patient's allergies indicates:  No Known Allergies    Review of Systems   Constitutional: Negative for activity change, appetite change, fatigue, fever and malaise/fatigue.   HENT: Negative for congestion, ear pain, hearing loss, postnasal drip, sinus pressure, sinus pain, sneezing and sore throat.    Eyes: Negative for blurred vision, photophobia and pain.   Respiratory: Negative for cough, chest tightness, shortness of breath and wheezing.    Cardiovascular: Negative for chest pain, palpitations, orthopnea, leg swelling and PND.   Gastrointestinal: Negative for abdominal distention, abdominal pain, blood in stool, constipation, diarrhea, nausea and vomiting.   Endocrine: Negative for cold intolerance, heat intolerance, polydipsia and polyuria.   Genitourinary: Negative for difficulty urinating, dysuria, flank pain, frequency, hematuria and urgency.   Musculoskeletal: Negative for arthralgias, back pain, joint swelling, myalgias and neck pain.   Skin: Negative for pallor and rash.   Allergic/Immunologic: Negative for environmental allergies and food allergies.   Neurological: Negative for dizziness, weakness, light-headedness and headaches.   Hematological: Does not bruise/bleed easily.   Psychiatric/Behavioral: Negative for confusion, decreased concentration and sleep disturbance. The patient is not nervous/anxious.           Blood pressure 130/86, pulse 85, temperature 98.2 °F (36.8 °C), resp. rate 16, height 6' 2" (1.88 m), weight 102.5 kg (226 lb), SpO2 99 %. Body mass index is 29.02 kg/m².   Objective:      Physical Exam  Constitutional:       General: He is not in acute distress.     Appearance: Normal appearance. He is well-developed.   HENT:      Head: Normocephalic and atraumatic.      Right Ear: External ear normal.      Left Ear: External ear normal.   Eyes:      General:         Right eye: No discharge.         Left eye: No discharge.      Conjunctiva/sclera: Conjunctivae normal. "      Pupils: Pupils are equal, round, and reactive to light.   Cardiovascular:      Rate and Rhythm: Normal rate and regular rhythm.      Heart sounds: Normal heart sounds. No murmur.   Pulmonary:      Effort: Pulmonary effort is normal.      Breath sounds: Normal breath sounds.   Skin:     General: Skin is warm and dry.      Capillary Refill: Capillary refill takes less than 2 seconds.   Neurological:      Mental Status: He is alert and oriented to person, place, and time.   Psychiatric:         Mood and Affect: Mood normal.         Thought Content: Thought content normal.         Judgment: Judgment normal.             Assessment:       1. Attention deficit disorder (ADD) without hyperactivity    2. Essential hypertension    3. Encounter for lipid screening for cardiovascular disease    4. Diabetes mellitus screening    5. Encounter for screening for human immunodeficiency virus (HIV)    6. Encounter for hepatitis C screening test for low risk patient         Plan:           Attention deficit disorder (ADD) without hyperactivity  -     VYVANSE 50 mg capsule; TK ONE C PO  QAM  Dispense: 90 capsule; Refill: 0  Will re-fill his Vyvanse  Essential hypertension  -     lisinopriL-hydrochlorothiazide (PRINZIDE,ZESTORETIC) 20-25 mg Tab; Take 1 tablet by mouth once daily.  Dispense: 90 tablet; Refill: 3  Will increase his blood pressure medications to get his blood pressure lower.  Encounter for lipid screening for cardiovascular disease  -     Lipid Panel; Future; Expected date: 12/10/2020    Diabetes mellitus screening  -     Comprehensive Metabolic Panel; Future; Expected date: 12/10/2020    Encounter for screening for human immunodeficiency virus (HIV)  -     HIV 1/2 Ag/Ab (4th Gen); Future; Expected date: 12/10/2020    Encounter for hepatitis C screening test for low risk patient  -     Hepatitis C Antibody; Future; Expected date: 12/10/2020

## 2020-12-14 ENCOUNTER — CLINICAL SUPPORT (OUTPATIENT)
Dept: REHABILITATION | Facility: HOSPITAL | Age: 45
End: 2020-12-14
Payer: COMMERCIAL

## 2020-12-14 DIAGNOSIS — R29.898 RIGHT HAND WEAKNESS: ICD-10-CM

## 2020-12-14 DIAGNOSIS — M25.541 PAIN IN JOINT OF RIGHT HAND: Primary | ICD-10-CM

## 2020-12-14 DIAGNOSIS — M25.441 EFFUSION OF JOINT OF RIGHT HAND: ICD-10-CM

## 2020-12-14 DIAGNOSIS — M25.641 STIFFNESS OF RIGHT HAND JOINT: ICD-10-CM

## 2020-12-14 PROCEDURE — 97022 WHIRLPOOL THERAPY: CPT | Mod: PN

## 2020-12-14 PROCEDURE — 97110 THERAPEUTIC EXERCISES: CPT | Mod: PN

## 2020-12-14 NOTE — PROGRESS NOTES
Occupational Therapy Daily Treatment Note     Date: 12/14/2020  Name: Lucas Figueroa  Clinic Number: [id number]    Therapy Diagnosis:   Encounter Diagnoses   Name Primary?    Pain in joint of right hand Yes    Stiffness of right hand joint     Effusion of joint of right hand     Right hand weakness      Physician: Rayshawn Schultz III*    Physician Orders: evaluate and tx, see epic  Medical Diagnosis: right wrist partial scapholunate ligament tear, chronic TFCC tear  Surgical Procedure and Date: right wrist arthroscopy, scapholunate ligament ligament debridement, scapholunate laser ligamentoplasty, tfcc debridement; 11-16-20  Evaluation Date: 12/7/2020  Insurance Authorization Period Expiration: referral # [id number] 12-7-20 thru 1-7-21  Plan of Care Certification Period: 12-7-20 thru 1-4-20  Date of Return to MD: 12-16-20    Visit # / Visits authorized: 1 / 8 referral # [id number] 12-7-20 thru 1-7-21  Time In:445  Time Out: 530  Total Billable Time: 45 minutes    Precautions:  [facility]iversa    Subjective     Pt reports: no new issues  he is compliant with home exercise program.  Response to previous treatment:good  Functional change: slight increase in use    Pain: 0/10 rest; 2/10 light use  Location: diffuse      Wrist ache  Objective       Timed units:  2 therapeutic exercise                            Time:5-530    Untimed units:  fluidotherapy                           Time:445-5      Measurements: n/a        Fluido: 15'  U/s:n/a      UBE: level 1 x 10'    Scar massage: n/a    Stretches as tolerated-pain free: n/a      Manual: n/a    ROM: wrist maze 5', towel grabs x 30     PRE: n/a      HEP: reviewed    Home Exercises and Education Provided     Education provided:     progress towards goals   likely tx progression  rationale of rehab interventions    Written Home Exercises Provided: no          Previously issued exercises were reviewed if still part of current tx plan as well as any issued today and Lucas was  able to demonstrate them prior to the end of the session.  Lucas demonstrated good understanding of the HEP provided.     See EMR under patient instructions and/or media for HEP issued today or in past    Assessment                 Pt would continue to benefit from skilled OT in order to facilitate increased use.    Lucas is progressing well towards his goals and there are no updates to goals at this time. Pt prognosis is good.  Pt will continue to benefit from skilled outpatient occupational therapy to address the deficits listed in the problem list on initial evaluation to provide pt/family education and to maximize pt's level of independence in the home and community environment.     Anticipated barriers to occupational therapy: edema, pain, stiffness, weakness    Pt's spiritual, cultural and educational needs considered and pt agreeable to plan of care and goals.    Goals:  stg to be met in 3 weeks 1. Patient will be I with HEP 2. Patient will have 2/10 pain with light use- met 12-14-20 3. Patient will have 75% prom of right distal arm vs left to enhance affected arm use with ADL        ltg to be met by d/c 1. Patient will be I with d/c HEP 2. Patient will have 1/10 pain with all use 3. Patient will have about 75% /pinch  on affected side vs unaffected side to promote full functional use vs shoulder goal                                                                    4. Patient will be I with all ADL       all goals ongoing unless otherwise noted                 Any goals met today ?  (if any met see above)    Updates/Grading for next session: prn    Plan: splint per md instruction, edema control, ROM, manual tx, PRE, patient education, HEP      George Woodson, OT/CHT

## 2020-12-17 ENCOUNTER — CLINICAL SUPPORT (OUTPATIENT)
Dept: REHABILITATION | Facility: HOSPITAL | Age: 45
End: 2020-12-17
Payer: COMMERCIAL

## 2020-12-17 DIAGNOSIS — R29.898 RIGHT HAND WEAKNESS: ICD-10-CM

## 2020-12-17 DIAGNOSIS — M25.541 PAIN IN JOINT OF RIGHT HAND: Primary | ICD-10-CM

## 2020-12-17 DIAGNOSIS — M25.641 STIFFNESS OF RIGHT HAND JOINT: ICD-10-CM

## 2020-12-17 DIAGNOSIS — M25.441 EFFUSION OF JOINT OF RIGHT HAND: ICD-10-CM

## 2020-12-17 PROCEDURE — 97022 WHIRLPOOL THERAPY: CPT | Mod: PN

## 2020-12-17 PROCEDURE — 97110 THERAPEUTIC EXERCISES: CPT | Mod: PN

## 2020-12-17 NOTE — PROGRESS NOTES
Occupational Therapy Daily Treatment Note     Date: 12/17/2020  Name: Lucas Figueroa  Clinic Number: [id number]    Therapy Diagnosis:   Encounter Diagnoses   Name Primary?    Pain in joint of right hand Yes    Stiffness of right hand joint     Effusion of joint of right hand     Right hand weakness      Physician: Rayshawn Schultz III*    Physician Orders: evaluate and tx, see epic  Medical Diagnosis: right wrist partial scapholunate ligament tear, chronic TFCC tear  Surgical Procedure and Date: right wrist arthroscopy, scapholunate ligament ligament debridement, scapholunate laser ligamentoplasty, tfcc debridement; 11-16-20  Evaluation Date: 12/7/2020  Insurance Authorization Period Expiration: referral # [id number] 12-7-20 thru 1-7-21  Plan of Care Certification Period: 12-7-20 thru 1-4-20  Date of Return to MD: 12-16-20    Visit # / Visits authorized: 2 / 8 referral # [id number] 12-7-20 thru 1-7-21  Time In:7  Time Out: 745  Total Billable Time: 45 minutes    Precautions:  [facility]iversa    Subjective     Pt reports: no new issues, saw referring md who discharged splint  he is compliant with home exercise program.  Response to previous treatment:good  Functional change: slight increase in use continues day to day with light tasks    Pain: 0/10 rest; 2/10 light use  Location: diffuse      Wrist ache  Objective       Timed units:  2 therapeutic exercise                            Time:715-745    Untimed units:  fluidotherapy                           Time:7-715      Measurements: n/a        Fluido: 15'  U/s:n/a      UBE: level 1 x 10'    Scar massage: n/a    Stretches as tolerated-pain free: n/a      Manual: n/a    ROM: wrist maze 5', towel grabs x 30     PRE: n/a      HEP: reviewed    Home Exercises and Education Provided     Education provided:     Told to wean splint use over the next 5 days or so and to continue light use only        progress towards goals   likely tx progression  rationale of rehab  interventions    Written Home Exercises Provided: no          Previously issued exercises were reviewed if still part of current tx plan as well as any issued today and Lucas was able to demonstrate them prior to the end of the session.  Lucas demonstrated good understanding of the HEP provided.     See EMR under patient instructions and/or media for HEP issued today or in past    Assessment       Pain and tightness continue to decrease          Pt would continue to benefit from skilled OT in order to facilitate increased use.    Lucas is progressing well towards his goals and there are no updates to goals at this time. Pt prognosis is good.  Pt will continue to benefit from skilled outpatient occupational therapy to address the deficits listed in the problem list on initial evaluation to provide pt/family education and to maximize pt's level of independence in the home and community environment.     Anticipated barriers to occupational therapy: edema, pain, stiffness, weakness    Pt's spiritual, cultural and educational needs considered and pt agreeable to plan of care and goals.    Goals:  stg to be met in 3 weeks 1. Patient will be I with HEP 2. Patient will have 2/10 pain with light use- met 12-14-20 3. Patient will have 75% prom of right distal arm vs left to enhance affected arm use with ADL        ltg to be met by d/c 1. Patient will be I with d/c HEP 2. Patient will have 1/10 pain with all use 3. Patient will have about 75% /pinch  on affected side vs unaffected side to promote full functional use vs shoulder goal                                                                    4. Patient will be I with all ADL       all goals ongoing unless otherwise noted                 Any goals met today ?  (if any met see above)    Updates/Grading for next session: prn    Plan: splint per md instruction, edema control, ROM, manual tx, PRE, patient education, HEP      George Woodson, OT/CHT

## 2020-12-21 ENCOUNTER — CLINICAL SUPPORT (OUTPATIENT)
Dept: REHABILITATION | Facility: HOSPITAL | Age: 45
End: 2020-12-21
Payer: COMMERCIAL

## 2020-12-21 DIAGNOSIS — R29.898 RIGHT HAND WEAKNESS: ICD-10-CM

## 2020-12-21 DIAGNOSIS — M25.641 STIFFNESS OF RIGHT HAND JOINT: ICD-10-CM

## 2020-12-21 DIAGNOSIS — M25.541 PAIN IN JOINT OF RIGHT HAND: ICD-10-CM

## 2020-12-21 PROCEDURE — 97022 WHIRLPOOL THERAPY: CPT | Mod: PN

## 2020-12-21 PROCEDURE — 97110 THERAPEUTIC EXERCISES: CPT | Mod: PN

## 2020-12-21 NOTE — PROGRESS NOTES
Occupational Therapy Daily Treatment Note     Date: 12/21/2020  Name: Lucas Figueroa  Clinic Number: [id number]    Therapy Diagnosis:   No diagnosis found.  Physician: Rayshawn Schultz III*    Physician Orders: evaluate and tx, see epic  Medical Diagnosis: right wrist partial scapholunate ligament tear, chronic TFCC tear  Surgical Procedure and Date: right wrist arthroscopy, scapholunate ligament ligament debridement, scapholunate laser ligamentoplasty, tfcc debridement; 11-16-20  Evaluation Date: 12/7/2020  Insurance Authorization Period Expiration: referral # [id number] 12-7-20 thru 1-7-21  Plan of Care Certification Period: 12-7-20 thru 1-4-20  Date of Return to MD: 12-16-20    Visit # / Visits authorized: 3 / 8 referral # [id number] 12-7-20 thru 1-7-21  Time In:715  Time Out: 8  Total Billable Time: 45 minutes    Precautions:  [facility]iversa    Subjective     Pt reports: no new issues  he is compliant with home exercise program.  Response to previous treatment:good  Functional change: overall use continues to increase with basic tasks  Pain: 0/10 rest; 2/10 light use  Location: diffuse      Wrist ache  Objective       Timed units:  2 therapeutic exercise                            Time:730-8    Untimed units:  fluidotherapy                           Time:715-730      Measurements: n/a        Fluido: 15'  U/s:n/a      UBE: level 1 x 10'    Scar massage: n/a    Stretches as tolerated-pain free: n/a      Manual: n/a    ROM: n/a    PRE:   Light grippers 2 x 10  Red flex bar smiles/frowns 3 x 10  Tan putty key, 3 jaw, tip, cone, small splint stick pull/push x 30 each  Green putty small splint stick pushdowns x 30    HEP: reviewed    Home Exercises and Education Provided     Education provided:     Explained some of the decreasing soreness is likely from weaning brace        progress towards goals   likely tx progression  rationale of rehab interventions    Written Home Exercises Provided: no          Previously issued  exercises were reviewed if still part of current tx plan as well as any issued today and Lucas was able to demonstrate them prior to the end of the session.  Lucas demonstrated good understanding of the HEP provided.     See EMR under patient instructions and/or media for HEP issued today or in past    Assessment       Slow progression of  and pinch PRE should assist in promoting increased use with required tasks          Pt would continue to benefit from skilled OT in order to facilitate increased use.    Lucas is progressing well towards his goals and there are no updates to goals at this time. Pt prognosis is good.  Pt will continue to benefit from skilled outpatient occupational therapy to address the deficits listed in the problem list on initial evaluation to provide pt/family education and to maximize pt's level of independence in the home and community environment.     Anticipated barriers to occupational therapy: edema, pain, stiffness, weakness    Pt's spiritual, cultural and educational needs considered and pt agreeable to plan of care and goals.    Goals:  stg to be met in 3 weeks 1. Patient will be I with HEP 2. Patient will have 2/10 pain with light use- met 12-14-20 3. Patient will have 75% prom of right distal arm vs left to enhance affected arm use with ADL        ltg to be met by d/c 1. Patient will be I with d/c HEP 2. Patient will have 1/10 pain with all use 3. Patient will have about 75% /pinch  on affected side vs unaffected side to promote full functional use vs shoulder goal                                                                    4. Patient will be I with all ADL       all goals ongoing unless otherwise noted                 Any goals met today ?  (if any met see above)    Updates/Grading for next session: prn, consider  and pinch testing    Plan: splint per md instruction, edema control, ROM, manual tx, PRE, patient education, HEP      George Woodson, OT/CHT

## 2020-12-28 ENCOUNTER — CLINICAL SUPPORT (OUTPATIENT)
Dept: REHABILITATION | Facility: HOSPITAL | Age: 45
End: 2020-12-28
Payer: COMMERCIAL

## 2020-12-28 DIAGNOSIS — R29.898 RIGHT HAND WEAKNESS: ICD-10-CM

## 2020-12-28 DIAGNOSIS — M25.541 PAIN IN JOINT OF RIGHT HAND: Primary | ICD-10-CM

## 2020-12-28 DIAGNOSIS — M25.641 STIFFNESS OF RIGHT HAND JOINT: ICD-10-CM

## 2020-12-28 DIAGNOSIS — M25.441 EFFUSION OF JOINT OF RIGHT HAND: ICD-10-CM

## 2020-12-28 PROCEDURE — 97022 WHIRLPOOL THERAPY: CPT | Mod: PN

## 2020-12-28 PROCEDURE — 97110 THERAPEUTIC EXERCISES: CPT | Mod: PN

## 2020-12-28 NOTE — PROGRESS NOTES
Occupational Therapy Daily Treatment Note     Date: 12/28/2020  Name: Lucas Figueroa  Clinic Number: [id number]    Therapy Diagnosis:   Encounter Diagnoses   Name Primary?    Pain in joint of right hand Yes    Stiffness of right hand joint     Right hand weakness     Effusion of joint of right hand      Physician: Rayshawn Schultz III*    Physician Orders: evaluate and tx, see epic  Medical Diagnosis: right wrist partial scapholunate ligament tear, chronic TFCC tear  Surgical Procedure and Date: right wrist arthroscopy, scapholunate ligament ligament debridement, scapholunate laser ligamentoplasty, tfcc debridement; 11-16-20  Evaluation Date: 12/7/2020  Insurance Authorization Period Expiration: referral # [id number] 12-7-20 thru 1-7-21  Plan of Care Certification Period: 12-7-20 thru 1-4-20  Date of Return to MD: 12-16-20    Visit # / Visits authorized: 4 / 8 referral # [id number] 12-7-20 thru 1-7-21  Time In:7  Time Out: 745  Total Billable Time: 45 minutes    Precautions:  universal    Subjective     Pt reports: no new issues  he is compliant with home exercise program.  Response to previous treatment:good  Functional change: overall use continues to increase with basic tasks day to day  Pain: 0/10 rest; 2/10 light use  Location: diffuse      Wrist ache  Objective       Timed units:  2 therapeutic exercise                            Time:715-745    Untimed units:  fluidotherapy                           Time:7-715      Measurements:                             II                III          key            3jaw                   tip  right             75#              70         21                11                     10  left              120              100       20                14                     14    Fluido: 15'  U/s:n/a      UBE: level 1 x 10'    Scar massage: n/a    Stretches as tolerated-pain free: n/a      Manual: n/a    ROM: n/a    PRE:   Blue, black, green digiflex 2 x 10  Red flex bar  smiles/frowns 3 x 10  yellow putty key, 3 jaw, tip, cone, small splint stick pull/push x 30 each    HEP: reviewed    Home Exercises and Education Provided     Education provided:             progress towards goals   likely tx progression  rationale of rehab interventions    Written Home Exercises Provided: no          Previously issued exercises were reviewed if still part of current tx plan as well as any issued today and Lucas was able to demonstrate them prior to the end of the session.  Lucas demonstrated good understanding of the HEP provided.     See EMR under patient instructions and/or media for HEP issued today or in past    Assessment        and pinch weakness noted as above          Pt would continue to benefit from skilled OT in order to facilitate increased use.    Lucas is progressing well towards his goals and there are no updates to goals at this time. Pt prognosis is good.  Pt will continue to benefit from skilled outpatient occupational therapy to address the deficits listed in the problem list on initial evaluation to provide pt/family education and to maximize pt's level of independence in the home and community environment.     Anticipated barriers to occupational therapy: edema, pain, stiffness, weakness    Pt's spiritual, cultural and educational needs considered and pt agreeable to plan of care and goals.    Goals:  stg to be met in 3 weeks 1. Patient will be I with HEP 2. Patient will have 2/10 pain with light use- met 12-14-20 3. Patient will have 75% prom of right distal arm vs left to enhance affected arm use with ADL        ltg to be met by d/c 1. Patient will be I with d/c HEP 2. Patient will have 1/10 pain with all use 3. Patient will have about 75% /pinch  on affected side vs unaffected side to promote full functional use vs shoulder goal                                                                    4. Patient will be I with all ADL       all goals ongoing unless  otherwise noted                 Any goals met today ?  (if any met see above)    Updates/Grading for next session: prn    Plan: splint per md instruction, edema control, ROM, manual tx, PRE, patient education, HEP      George Voorhies, OT/CHT

## 2021-01-04 ENCOUNTER — CLINICAL SUPPORT (OUTPATIENT)
Dept: REHABILITATION | Facility: HOSPITAL | Age: 46
End: 2021-01-04
Payer: COMMERCIAL

## 2021-01-04 DIAGNOSIS — M25.641 STIFFNESS OF RIGHT HAND JOINT: ICD-10-CM

## 2021-01-04 DIAGNOSIS — M25.441 EFFUSION OF JOINT OF RIGHT HAND: ICD-10-CM

## 2021-01-04 DIAGNOSIS — R29.898 RIGHT HAND WEAKNESS: ICD-10-CM

## 2021-01-04 DIAGNOSIS — M25.541 PAIN IN JOINT OF RIGHT HAND: Primary | ICD-10-CM

## 2021-01-04 PROCEDURE — 97022 WHIRLPOOL THERAPY: CPT | Mod: PN

## 2021-01-04 PROCEDURE — 97110 THERAPEUTIC EXERCISES: CPT | Mod: PN

## 2021-01-07 ENCOUNTER — CLINICAL SUPPORT (OUTPATIENT)
Dept: REHABILITATION | Facility: HOSPITAL | Age: 46
End: 2021-01-07
Payer: COMMERCIAL

## 2021-01-07 DIAGNOSIS — R29.898 RIGHT HAND WEAKNESS: ICD-10-CM

## 2021-01-07 DIAGNOSIS — M25.641 STIFFNESS OF RIGHT HAND JOINT: ICD-10-CM

## 2021-01-07 DIAGNOSIS — M25.441 EFFUSION OF JOINT OF RIGHT HAND: ICD-10-CM

## 2021-01-07 DIAGNOSIS — M25.541 PAIN IN JOINT OF RIGHT HAND: Primary | ICD-10-CM

## 2021-01-07 PROCEDURE — 97022 WHIRLPOOL THERAPY: CPT | Mod: PN

## 2021-01-07 PROCEDURE — 97110 THERAPEUTIC EXERCISES: CPT | Mod: PN

## 2021-01-15 ENCOUNTER — CLINICAL SUPPORT (OUTPATIENT)
Dept: REHABILITATION | Facility: HOSPITAL | Age: 46
End: 2021-01-15
Payer: COMMERCIAL

## 2021-01-15 DIAGNOSIS — M25.441 EFFUSION OF JOINT OF RIGHT HAND: ICD-10-CM

## 2021-01-15 DIAGNOSIS — M25.541 PAIN IN JOINT OF RIGHT HAND: Primary | ICD-10-CM

## 2021-01-15 DIAGNOSIS — M25.641 STIFFNESS OF RIGHT HAND JOINT: ICD-10-CM

## 2021-01-15 DIAGNOSIS — R29.898 RIGHT HAND WEAKNESS: ICD-10-CM

## 2021-01-15 PROCEDURE — 97110 THERAPEUTIC EXERCISES: CPT | Mod: PN

## 2021-01-15 PROCEDURE — 97022 WHIRLPOOL THERAPY: CPT | Mod: PN

## 2021-01-25 ENCOUNTER — DOCUMENTATION ONLY (OUTPATIENT)
Dept: REHABILITATION | Facility: HOSPITAL | Age: 46
End: 2021-01-25

## 2021-03-10 ENCOUNTER — LAB VISIT (OUTPATIENT)
Dept: LAB | Facility: HOSPITAL | Age: 46
End: 2021-03-10
Attending: FAMILY MEDICINE
Payer: COMMERCIAL

## 2021-03-10 ENCOUNTER — OFFICE VISIT (OUTPATIENT)
Dept: FAMILY MEDICINE | Facility: CLINIC | Age: 46
End: 2021-03-10
Payer: COMMERCIAL

## 2021-03-10 VITALS
OXYGEN SATURATION: 98 % | BODY MASS INDEX: 30.48 KG/M2 | RESPIRATION RATE: 17 BRPM | DIASTOLIC BLOOD PRESSURE: 86 MMHG | HEART RATE: 93 BPM | TEMPERATURE: 98 F | SYSTOLIC BLOOD PRESSURE: 122 MMHG | HEIGHT: 74 IN | WEIGHT: 237.5 LBS

## 2021-03-10 DIAGNOSIS — Z11.4 ENCOUNTER FOR SCREENING FOR HUMAN IMMUNODEFICIENCY VIRUS (HIV): ICD-10-CM

## 2021-03-10 DIAGNOSIS — Z13.220 ENCOUNTER FOR LIPID SCREENING FOR CARDIOVASCULAR DISEASE: ICD-10-CM

## 2021-03-10 DIAGNOSIS — Z13.1 DIABETES MELLITUS SCREENING: ICD-10-CM

## 2021-03-10 DIAGNOSIS — F98.8 ATTENTION DEFICIT DISORDER (ADD) WITHOUT HYPERACTIVITY: ICD-10-CM

## 2021-03-10 DIAGNOSIS — Z11.59 ENCOUNTER FOR HEPATITIS C SCREENING TEST FOR LOW RISK PATIENT: ICD-10-CM

## 2021-03-10 DIAGNOSIS — Z13.6 ENCOUNTER FOR LIPID SCREENING FOR CARDIOVASCULAR DISEASE: ICD-10-CM

## 2021-03-10 LAB
ALBUMIN SERPL BCP-MCNC: 4 G/DL (ref 3.5–5.2)
ALP SERPL-CCNC: 65 U/L (ref 55–135)
ALT SERPL W/O P-5'-P-CCNC: 32 U/L (ref 10–44)
ANION GAP SERPL CALC-SCNC: 10 MMOL/L (ref 8–16)
AST SERPL-CCNC: 20 U/L (ref 10–40)
BILIRUB SERPL-MCNC: 0.7 MG/DL (ref 0.1–1)
BUN SERPL-MCNC: 20 MG/DL (ref 6–20)
CALCIUM SERPL-MCNC: 9.3 MG/DL (ref 8.7–10.5)
CHLORIDE SERPL-SCNC: 104 MMOL/L (ref 95–110)
CHOLEST SERPL-MCNC: 199 MG/DL (ref 120–199)
CHOLEST/HDLC SERPL: 3.8 {RATIO} (ref 2–5)
CO2 SERPL-SCNC: 27 MMOL/L (ref 23–29)
CREAT SERPL-MCNC: 1.1 MG/DL (ref 0.5–1.4)
EST. GFR  (AFRICAN AMERICAN): >60 ML/MIN/1.73 M^2
EST. GFR  (NON AFRICAN AMERICAN): >60 ML/MIN/1.73 M^2
GLUCOSE SERPL-MCNC: 96 MG/DL (ref 70–110)
HDLC SERPL-MCNC: 53 MG/DL (ref 40–75)
HDLC SERPL: 26.6 % (ref 20–50)
LDLC SERPL CALC-MCNC: 112.8 MG/DL (ref 63–159)
NONHDLC SERPL-MCNC: 146 MG/DL
POTASSIUM SERPL-SCNC: 3.9 MMOL/L (ref 3.5–5.1)
PROT SERPL-MCNC: 7.3 G/DL (ref 6–8.4)
SODIUM SERPL-SCNC: 141 MMOL/L (ref 136–145)
TRIGL SERPL-MCNC: 166 MG/DL (ref 30–150)

## 2021-03-10 PROCEDURE — 99213 OFFICE O/P EST LOW 20 MIN: CPT | Mod: S$GLB,,, | Performed by: FAMILY MEDICINE

## 2021-03-10 PROCEDURE — 80061 LIPID PANEL: CPT | Performed by: FAMILY MEDICINE

## 2021-03-10 PROCEDURE — 87389 HIV-1 AG W/HIV-1&-2 AB AG IA: CPT | Performed by: FAMILY MEDICINE

## 2021-03-10 PROCEDURE — 86803 HEPATITIS C AB TEST: CPT | Performed by: FAMILY MEDICINE

## 2021-03-10 PROCEDURE — 80053 COMPREHEN METABOLIC PANEL: CPT | Performed by: FAMILY MEDICINE

## 2021-03-10 PROCEDURE — 99213 PR OFFICE/OUTPT VISIT, EST, LEVL III, 20-29 MIN: ICD-10-PCS | Mod: S$GLB,,, | Performed by: FAMILY MEDICINE

## 2021-03-10 RX ORDER — LISDEXAMFETAMINE DIMESYLATE 50 MG/1
CAPSULE ORAL
Qty: 90 CAPSULE | Refills: 0 | Status: SHIPPED | OUTPATIENT
Start: 2021-03-10 | End: 2021-06-10 | Stop reason: SDUPTHER

## 2021-03-11 LAB
HCV AB S/CO SERPL IA: <0.1 S/CO RATIO (ref 0–0.9)
HIV 1+2 AB+HIV1 P24 AG SERPL QL IA: NON REACTIVE

## 2021-03-19 ENCOUNTER — TELEPHONE (OUTPATIENT)
Dept: FAMILY MEDICINE | Facility: CLINIC | Age: 46
End: 2021-03-19

## 2021-03-22 ENCOUNTER — TELEPHONE (OUTPATIENT)
Dept: FAMILY MEDICINE | Facility: CLINIC | Age: 46
End: 2021-03-22

## 2021-06-10 ENCOUNTER — OFFICE VISIT (OUTPATIENT)
Dept: FAMILY MEDICINE | Facility: CLINIC | Age: 46
End: 2021-06-10
Payer: COMMERCIAL

## 2021-06-10 VITALS
BODY MASS INDEX: 30.03 KG/M2 | HEIGHT: 74 IN | HEART RATE: 87 BPM | TEMPERATURE: 98 F | OXYGEN SATURATION: 97 % | DIASTOLIC BLOOD PRESSURE: 88 MMHG | WEIGHT: 234 LBS | SYSTOLIC BLOOD PRESSURE: 122 MMHG | RESPIRATION RATE: 17 BRPM

## 2021-06-10 DIAGNOSIS — Z12.5 PROSTATE CANCER SCREENING: ICD-10-CM

## 2021-06-10 DIAGNOSIS — F98.8 ATTENTION DEFICIT DISORDER (ADD) WITHOUT HYPERACTIVITY: ICD-10-CM

## 2021-06-10 DIAGNOSIS — R00.2 PALPITATIONS: Primary | ICD-10-CM

## 2021-06-10 PROCEDURE — 99213 OFFICE O/P EST LOW 20 MIN: CPT | Mod: S$GLB,,, | Performed by: FAMILY MEDICINE

## 2021-06-10 PROCEDURE — 99213 PR OFFICE/OUTPT VISIT, EST, LEVL III, 20-29 MIN: ICD-10-PCS | Mod: S$GLB,,, | Performed by: FAMILY MEDICINE

## 2021-06-10 RX ORDER — LISDEXAMFETAMINE DIMESYLATE 50 MG/1
CAPSULE ORAL
Qty: 90 CAPSULE | Refills: 0 | Status: SHIPPED | OUTPATIENT
Start: 2021-06-10 | End: 2021-09-09

## 2021-09-09 ENCOUNTER — OFFICE VISIT (OUTPATIENT)
Dept: FAMILY MEDICINE | Facility: CLINIC | Age: 46
End: 2021-09-09
Payer: COMMERCIAL

## 2021-09-09 VITALS
WEIGHT: 233.38 LBS | RESPIRATION RATE: 18 BRPM | HEART RATE: 97 BPM | HEIGHT: 74 IN | OXYGEN SATURATION: 99 % | SYSTOLIC BLOOD PRESSURE: 121 MMHG | TEMPERATURE: 98 F | BODY MASS INDEX: 29.95 KG/M2 | DIASTOLIC BLOOD PRESSURE: 86 MMHG

## 2021-09-09 DIAGNOSIS — I10 ESSENTIAL HYPERTENSION: ICD-10-CM

## 2021-09-09 DIAGNOSIS — F98.8 ATTENTION DEFICIT DISORDER (ADD) WITHOUT HYPERACTIVITY: ICD-10-CM

## 2021-09-09 DIAGNOSIS — R00.2 PALPITATIONS: Primary | ICD-10-CM

## 2021-09-09 PROCEDURE — 99214 PR OFFICE/OUTPT VISIT, EST, LEVL IV, 30-39 MIN: ICD-10-PCS | Mod: S$GLB,,, | Performed by: FAMILY MEDICINE

## 2021-09-09 PROCEDURE — 99214 OFFICE O/P EST MOD 30 MIN: CPT | Mod: S$GLB,,, | Performed by: FAMILY MEDICINE

## 2021-09-09 RX ORDER — LISDEXAMFETAMINE DIMESYLATE 40 MG/1
40 CAPSULE ORAL DAILY
Qty: 90 CAPSULE | Refills: 0 | Status: SHIPPED | OUTPATIENT
Start: 2021-09-09 | End: 2021-09-21 | Stop reason: SDUPTHER

## 2021-09-09 RX ORDER — LISINOPRIL AND HYDROCHLOROTHIAZIDE 10; 12.5 MG/1; MG/1
1 TABLET ORAL DAILY
Qty: 90 TABLET | Refills: 3 | Status: SHIPPED | OUTPATIENT
Start: 2021-09-09 | End: 2022-09-13 | Stop reason: SDUPTHER

## 2021-09-18 ENCOUNTER — LAB VISIT (OUTPATIENT)
Dept: LAB | Facility: HOSPITAL | Age: 46
End: 2021-09-18
Attending: FAMILY MEDICINE
Payer: COMMERCIAL

## 2021-09-18 DIAGNOSIS — Z12.5 PROSTATE CANCER SCREENING: ICD-10-CM

## 2021-09-18 LAB — COMPLEXED PSA SERPL-MCNC: 0.61 NG/ML (ref 0–4)

## 2021-09-18 PROCEDURE — 36415 COLL VENOUS BLD VENIPUNCTURE: CPT | Performed by: FAMILY MEDICINE

## 2021-09-18 PROCEDURE — 84153 ASSAY OF PSA TOTAL: CPT | Performed by: FAMILY MEDICINE

## 2021-09-20 ENCOUNTER — TELEPHONE (OUTPATIENT)
Dept: FAMILY MEDICINE | Facility: CLINIC | Age: 46
End: 2021-09-20

## 2021-09-20 ENCOUNTER — PATIENT MESSAGE (OUTPATIENT)
Dept: FAMILY MEDICINE | Facility: CLINIC | Age: 46
End: 2021-09-20

## 2021-09-20 DIAGNOSIS — Z01.89 PATIENT REQUESTED DIAGNOSTIC TESTING: Primary | ICD-10-CM

## 2021-09-20 DIAGNOSIS — F98.8 ATTENTION DEFICIT DISORDER (ADD) WITHOUT HYPERACTIVITY: ICD-10-CM

## 2021-09-21 RX ORDER — LISDEXAMFETAMINE DIMESYLATE 40 MG/1
40 CAPSULE ORAL DAILY
Qty: 90 CAPSULE | Refills: 0 | Status: SHIPPED | OUTPATIENT
Start: 2021-09-21 | End: 2021-12-10 | Stop reason: SDUPTHER

## 2021-09-24 ENCOUNTER — LAB VISIT (OUTPATIENT)
Dept: LAB | Facility: HOSPITAL | Age: 46
End: 2021-09-24
Attending: FAMILY MEDICINE
Payer: COMMERCIAL

## 2021-09-24 DIAGNOSIS — Z01.89 PATIENT REQUESTED DIAGNOSTIC TESTING: ICD-10-CM

## 2021-09-24 LAB
ALBUMIN SERPL BCP-MCNC: 4.1 G/DL (ref 3.5–5.2)
ALP SERPL-CCNC: 61 U/L (ref 55–135)
ALT SERPL W/O P-5'-P-CCNC: 27 U/L (ref 10–44)
ANION GAP SERPL CALC-SCNC: 10 MMOL/L (ref 8–16)
AST SERPL-CCNC: 20 U/L (ref 10–40)
BILIRUB SERPL-MCNC: 1.1 MG/DL (ref 0.1–1)
BUN SERPL-MCNC: 16 MG/DL (ref 6–20)
CALCIUM SERPL-MCNC: 9.4 MG/DL (ref 8.7–10.5)
CHLORIDE SERPL-SCNC: 109 MMOL/L (ref 95–110)
CO2 SERPL-SCNC: 25 MMOL/L (ref 23–29)
CREAT SERPL-MCNC: 1.1 MG/DL (ref 0.5–1.4)
EST. GFR  (AFRICAN AMERICAN): >60 ML/MIN/1.73 M^2
EST. GFR  (NON AFRICAN AMERICAN): >60 ML/MIN/1.73 M^2
GLUCOSE SERPL-MCNC: 97 MG/DL (ref 70–110)
POTASSIUM SERPL-SCNC: 4.2 MMOL/L (ref 3.5–5.1)
PROT SERPL-MCNC: 7 G/DL (ref 6–8.4)
SODIUM SERPL-SCNC: 144 MMOL/L (ref 136–145)

## 2021-09-24 PROCEDURE — 36415 COLL VENOUS BLD VENIPUNCTURE: CPT | Performed by: FAMILY MEDICINE

## 2021-09-24 PROCEDURE — 80053 COMPREHEN METABOLIC PANEL: CPT | Performed by: FAMILY MEDICINE

## 2021-10-22 ENCOUNTER — OFFICE VISIT (OUTPATIENT)
Dept: FAMILY MEDICINE | Facility: CLINIC | Age: 46
End: 2021-10-22
Payer: COMMERCIAL

## 2021-10-22 VITALS
TEMPERATURE: 98 F | DIASTOLIC BLOOD PRESSURE: 88 MMHG | WEIGHT: 240.19 LBS | HEIGHT: 74 IN | OXYGEN SATURATION: 98 % | RESPIRATION RATE: 18 BRPM | SYSTOLIC BLOOD PRESSURE: 128 MMHG | BODY MASS INDEX: 30.83 KG/M2 | HEART RATE: 97 BPM

## 2021-10-22 DIAGNOSIS — Z01.818 PREOPERATIVE CLEARANCE: Primary | ICD-10-CM

## 2021-10-22 DIAGNOSIS — I10 ESSENTIAL HYPERTENSION: ICD-10-CM

## 2021-10-22 DIAGNOSIS — S42.021A CLOSED DISPLACED FRACTURE OF SHAFT OF RIGHT CLAVICLE, INITIAL ENCOUNTER: ICD-10-CM

## 2021-10-22 PROCEDURE — 99213 OFFICE O/P EST LOW 20 MIN: CPT | Mod: S$GLB,,, | Performed by: NURSE PRACTITIONER

## 2021-10-22 PROCEDURE — 99213 PR OFFICE/OUTPT VISIT, EST, LEVL III, 20-29 MIN: ICD-10-PCS | Mod: S$GLB,,, | Performed by: NURSE PRACTITIONER

## 2021-10-22 RX ORDER — METHOCARBAMOL 500 MG/1
500 TABLET, FILM COATED ORAL DAILY PRN
COMMUNITY
Start: 2021-10-19 | End: 2022-03-11 | Stop reason: ALTCHOICE

## 2021-12-10 ENCOUNTER — OFFICE VISIT (OUTPATIENT)
Dept: FAMILY MEDICINE | Facility: CLINIC | Age: 46
End: 2021-12-10
Payer: COMMERCIAL

## 2021-12-10 VITALS
HEART RATE: 100 BPM | DIASTOLIC BLOOD PRESSURE: 86 MMHG | TEMPERATURE: 98 F | BODY MASS INDEX: 30.12 KG/M2 | SYSTOLIC BLOOD PRESSURE: 130 MMHG | OXYGEN SATURATION: 98 % | WEIGHT: 234.69 LBS | HEIGHT: 74 IN

## 2021-12-10 DIAGNOSIS — F98.8 ATTENTION DEFICIT DISORDER (ADD) WITHOUT HYPERACTIVITY: ICD-10-CM

## 2021-12-10 PROCEDURE — 4010F PR ACE/ARB THEARPY RXD/TAKEN: ICD-10-PCS | Mod: S$GLB,,, | Performed by: FAMILY MEDICINE

## 2021-12-10 PROCEDURE — 4010F ACE/ARB THERAPY RXD/TAKEN: CPT | Mod: S$GLB,,, | Performed by: FAMILY MEDICINE

## 2021-12-10 PROCEDURE — 99213 PR OFFICE/OUTPT VISIT, EST, LEVL III, 20-29 MIN: ICD-10-PCS | Mod: S$GLB,,, | Performed by: FAMILY MEDICINE

## 2021-12-10 PROCEDURE — 99213 OFFICE O/P EST LOW 20 MIN: CPT | Mod: S$GLB,,, | Performed by: FAMILY MEDICINE

## 2021-12-10 RX ORDER — LISDEXAMFETAMINE DIMESYLATE 40 MG/1
40 CAPSULE ORAL DAILY
Qty: 90 CAPSULE | Refills: 0 | Status: SHIPPED | OUTPATIENT
Start: 2021-12-10 | End: 2022-03-11 | Stop reason: SDUPTHER

## 2022-03-11 ENCOUNTER — OFFICE VISIT (OUTPATIENT)
Dept: FAMILY MEDICINE | Facility: CLINIC | Age: 47
End: 2022-03-11
Payer: COMMERCIAL

## 2022-03-11 VITALS
SYSTOLIC BLOOD PRESSURE: 118 MMHG | WEIGHT: 244 LBS | HEIGHT: 74 IN | OXYGEN SATURATION: 96 % | DIASTOLIC BLOOD PRESSURE: 78 MMHG | HEART RATE: 106 BPM | BODY MASS INDEX: 31.32 KG/M2 | TEMPERATURE: 98 F

## 2022-03-11 DIAGNOSIS — F98.8 ATTENTION DEFICIT DISORDER (ADD) WITHOUT HYPERACTIVITY: ICD-10-CM

## 2022-03-11 PROCEDURE — 99213 OFFICE O/P EST LOW 20 MIN: CPT | Mod: S$GLB,,, | Performed by: FAMILY MEDICINE

## 2022-03-11 PROCEDURE — 99213 PR OFFICE/OUTPT VISIT, EST, LEVL III, 20-29 MIN: ICD-10-PCS | Mod: S$GLB,,, | Performed by: FAMILY MEDICINE

## 2022-03-11 PROCEDURE — 3008F PR BODY MASS INDEX (BMI) DOCUMENTED: ICD-10-PCS | Mod: S$GLB,,, | Performed by: FAMILY MEDICINE

## 2022-03-11 PROCEDURE — 3078F DIAST BP <80 MM HG: CPT | Mod: S$GLB,,, | Performed by: FAMILY MEDICINE

## 2022-03-11 PROCEDURE — 3008F BODY MASS INDEX DOCD: CPT | Mod: S$GLB,,, | Performed by: FAMILY MEDICINE

## 2022-03-11 PROCEDURE — 3074F SYST BP LT 130 MM HG: CPT | Mod: S$GLB,,, | Performed by: FAMILY MEDICINE

## 2022-03-11 PROCEDURE — 3074F PR MOST RECENT SYSTOLIC BLOOD PRESSURE < 130 MM HG: ICD-10-PCS | Mod: S$GLB,,, | Performed by: FAMILY MEDICINE

## 2022-03-11 PROCEDURE — 3078F PR MOST RECENT DIASTOLIC BLOOD PRESSURE < 80 MM HG: ICD-10-PCS | Mod: S$GLB,,, | Performed by: FAMILY MEDICINE

## 2022-03-11 RX ORDER — LISDEXAMFETAMINE DIMESYLATE 40 MG/1
40 CAPSULE ORAL DAILY
Qty: 90 CAPSULE | Refills: 0 | Status: SHIPPED | OUTPATIENT
Start: 2022-03-11 | End: 2022-06-17 | Stop reason: SDUPTHER

## 2022-03-11 NOTE — PROGRESS NOTES
"  SUBJECTIVE:    Patient ID: Lucas Figueroa is a 46 y.o. male.    Chief Complaint: Medication Refill  47 yo male here today to follow up on his ADD. .     Patient doing well on the Vyvanse patient states that his work is improved is improved his attitude home, he denies any chest pain shortness of breath weight loss dry mouth or headaches.      SPMHX:  Allergic Rhinitis: Zyrtec 10mg  ADD: Vyvanse 40mg  HTN: Started on Lisinopril/HCTZ 10/12.5 controlled.     Specialists:  Ortho: Dr Salamanca  Hand:  Dr Paulino     Smoke: None  ETOH: 5 a week  Exercise: Crossfit 4 days a week    HPI    ADHD Adult: On medications  Have difficulty sustaining attention in tasks or fun activities?  No  Don't follow through on instructions and fail to finish work?  No  Have difficulty organizing tasks and activities?  No  Avoid, dislike, or are reluctant to engage in work thar requires sustained mental effort? No  Easily distracted?  yes  Forgetful in daily activities?  yes   Fidget with hands or feet, or squirm in seat?  no  Have difficulty engaging in leisure activities or doing fun things quietly?  Yes  Feel "on the go" or "driven by a motor"?  no  Blurt out answers before questions have been completed?  no  Have difficulty waiting your turn, are impatient?  no  Interrupt or intrude on others?  no      Past Medical History:   Diagnosis Date    ADHD (attention deficit hyperactivity disorder)     Hypertension      Social History     Socioeconomic History    Marital status:    Tobacco Use    Smoking status: Never Smoker    Smokeless tobacco: Never Used   Substance and Sexual Activity    Alcohol use: Yes     Comment: occ    Drug use: Never    Sexual activity: Yes     Partners: Female     Past Surgical History:   Procedure Laterality Date    HERNIA REPAIR       Family History   Problem Relation Age of Onset    No Known Problems Mother     No Known Problems Father      Current Outpatient Medications   Medication Sig Dispense " "Refill    lisinopriL-hydrochlorothiazide (PRINZIDE,ZESTORETIC) 10-12.5 mg per tablet Take 1 tablet by mouth once daily. 90 tablet 3    lisdexamfetamine (VYVANSE) 40 MG Cap Take 1 capsule (40 mg total) by mouth once daily. 90 capsule 0     No current facility-administered medications for this visit.     Review of patient's allergies indicates:  No Known Allergies    Review of Systems   Constitutional: Negative for activity change, appetite change, fatigue and fever.   HENT: Negative for congestion, ear pain, hearing loss, postnasal drip, sinus pressure, sinus pain, sneezing and sore throat.    Eyes: Negative for photophobia and pain.   Respiratory: Negative for cough, chest tightness, shortness of breath and wheezing.    Cardiovascular: Negative for chest pain and leg swelling.   Gastrointestinal: Negative for abdominal distention, abdominal pain, blood in stool, constipation, diarrhea, nausea and vomiting.   Endocrine: Negative for cold intolerance, heat intolerance, polydipsia and polyuria.   Genitourinary: Negative for difficulty urinating, dysuria, flank pain, frequency, hematuria and urgency.   Musculoskeletal: Negative for arthralgias, back pain, joint swelling, myalgias and neck pain.   Skin: Negative for pallor and rash.   Allergic/Immunologic: Negative for environmental allergies and food allergies.   Neurological: Negative for dizziness, weakness, light-headedness and headaches.   Hematological: Does not bruise/bleed easily.   Psychiatric/Behavioral: Negative for confusion, decreased concentration and sleep disturbance. The patient is not nervous/anxious.           Blood pressure 118/78, pulse 106, temperature 98.2 °F (36.8 °C), temperature source Oral, height 6' 2" (1.88 m), weight 110.7 kg (244 lb), SpO2 96 %. Body mass index is 31.33 kg/m².   Objective:      Physical Exam  Vitals and nursing note reviewed.   Constitutional:       General: He is awake. He is not in acute distress.     Appearance: Normal " appearance. He is obese. He is not ill-appearing, toxic-appearing or diaphoretic.   HENT:      Head: Normocephalic and atraumatic.      Right Ear: Tympanic membrane, ear canal and external ear normal. There is no impacted cerumen.      Left Ear: Tympanic membrane, ear canal and external ear normal. There is no impacted cerumen.      Nose: Nose normal. No congestion or rhinorrhea.      Mouth/Throat:      Lips: Pink.      Mouth: Mucous membranes are moist.      Pharynx: Oropharynx is clear. Uvula midline. No oropharyngeal exudate or posterior oropharyngeal erythema.      Comments: Mallampati score 1  Eyes:      General: Lids are normal. Gaze aligned appropriately. No scleral icterus.     Conjunctiva/sclera: Conjunctivae normal.      Right eye: Right conjunctiva is not injected.      Left eye: Left conjunctiva is not injected.      Pupils: Pupils are equal, round, and reactive to light.   Cardiovascular:      Rate and Rhythm: Normal rate and regular rhythm.      Pulses: Normal pulses.      Heart sounds: Normal heart sounds, S1 normal and S2 normal. No murmur heard.  Pulmonary:      Effort: Pulmonary effort is normal. No respiratory distress.      Breath sounds: Normal breath sounds. No stridor. No decreased breath sounds, wheezing, rhonchi or rales.   Chest:      Chest wall: No tenderness.   Abdominal:      General: Bowel sounds are normal.      Palpations: Abdomen is soft.      Tenderness: There is no abdominal tenderness.   Musculoskeletal:      Cervical back: Neck supple.      Right lower leg: No edema.      Left lower leg: No edema.   Lymphadenopathy:      Cervical: No cervical adenopathy.   Skin:     General: Skin is warm and dry.      Capillary Refill: Capillary refill takes less than 2 seconds.   Neurological:      Mental Status: He is alert and oriented to person, place, and time. Mental status is at baseline.   Psychiatric:         Attention and Perception: Attention normal.         Mood and Affect: Mood  normal.         Speech: Speech normal.         Behavior: Behavior normal. Behavior is cooperative.         Thought Content: Thought content normal.         Judgment: Judgment normal.             Assessment:       1. Attention deficit disorder (ADD) without hyperactivity         Plan:           Attention deficit disorder (ADD) without hyperactivity  -     lisdexamfetamine (VYVANSE) 40 MG Cap; Take 1 capsule (40 mg total) by mouth once daily.  Dispense: 90 capsule; Refill: 0

## 2022-06-17 ENCOUNTER — OFFICE VISIT (OUTPATIENT)
Dept: FAMILY MEDICINE | Facility: CLINIC | Age: 47
End: 2022-06-17
Payer: COMMERCIAL

## 2022-06-17 VITALS
HEIGHT: 74 IN | WEIGHT: 234.5 LBS | DIASTOLIC BLOOD PRESSURE: 76 MMHG | TEMPERATURE: 98 F | HEART RATE: 77 BPM | OXYGEN SATURATION: 97 % | BODY MASS INDEX: 30.1 KG/M2 | SYSTOLIC BLOOD PRESSURE: 122 MMHG

## 2022-06-17 DIAGNOSIS — Z12.11 COLON CANCER SCREENING: ICD-10-CM

## 2022-06-17 DIAGNOSIS — F98.8 ATTENTION DEFICIT DISORDER (ADD) WITHOUT HYPERACTIVITY: Primary | ICD-10-CM

## 2022-06-17 PROCEDURE — 3074F SYST BP LT 130 MM HG: CPT | Mod: CPTII,S$GLB,, | Performed by: FAMILY MEDICINE

## 2022-06-17 PROCEDURE — 4010F PR ACE/ARB THEARPY RXD/TAKEN: ICD-10-PCS | Mod: CPTII,S$GLB,, | Performed by: FAMILY MEDICINE

## 2022-06-17 PROCEDURE — 3008F BODY MASS INDEX DOCD: CPT | Mod: CPTII,S$GLB,, | Performed by: FAMILY MEDICINE

## 2022-06-17 PROCEDURE — 1159F MED LIST DOCD IN RCRD: CPT | Mod: CPTII,S$GLB,, | Performed by: FAMILY MEDICINE

## 2022-06-17 PROCEDURE — 3078F PR MOST RECENT DIASTOLIC BLOOD PRESSURE < 80 MM HG: ICD-10-PCS | Mod: CPTII,S$GLB,, | Performed by: FAMILY MEDICINE

## 2022-06-17 PROCEDURE — 1159F PR MEDICATION LIST DOCUMENTED IN MEDICAL RECORD: ICD-10-PCS | Mod: CPTII,S$GLB,, | Performed by: FAMILY MEDICINE

## 2022-06-17 PROCEDURE — 99213 OFFICE O/P EST LOW 20 MIN: CPT | Mod: S$GLB,,, | Performed by: FAMILY MEDICINE

## 2022-06-17 PROCEDURE — 3008F PR BODY MASS INDEX (BMI) DOCUMENTED: ICD-10-PCS | Mod: CPTII,S$GLB,, | Performed by: FAMILY MEDICINE

## 2022-06-17 PROCEDURE — 4010F ACE/ARB THERAPY RXD/TAKEN: CPT | Mod: CPTII,S$GLB,, | Performed by: FAMILY MEDICINE

## 2022-06-17 PROCEDURE — 99213 PR OFFICE/OUTPT VISIT, EST, LEVL III, 20-29 MIN: ICD-10-PCS | Mod: S$GLB,,, | Performed by: FAMILY MEDICINE

## 2022-06-17 PROCEDURE — 3074F PR MOST RECENT SYSTOLIC BLOOD PRESSURE < 130 MM HG: ICD-10-PCS | Mod: CPTII,S$GLB,, | Performed by: FAMILY MEDICINE

## 2022-06-17 PROCEDURE — 3078F DIAST BP <80 MM HG: CPT | Mod: CPTII,S$GLB,, | Performed by: FAMILY MEDICINE

## 2022-06-17 RX ORDER — LISDEXAMFETAMINE DIMESYLATE 40 MG/1
40 CAPSULE ORAL DAILY
Qty: 90 CAPSULE | Refills: 0 | Status: SHIPPED | OUTPATIENT
Start: 2022-06-17 | End: 2022-09-19 | Stop reason: SDUPTHER

## 2022-06-17 NOTE — PROGRESS NOTES
"  SUBJECTIVE:    Patient ID: Lucas Figueroa is a 46 y.o. male.    Chief Complaint: Medication Refill, ADHD, and Follow-up  45 yo male here today to follow up on his ADD. .     Patient doing well on the Vyvanse patient states that his work is improved is improved his attitude home, he denies any chest pain shortness of breath weight loss dry mouth or headaches.      SPMHX:  Allergic Rhinitis: Zyrtec 10mg  ADD: Vyvanse 40mg  HTN: Started on Lisinopril/HCTZ 10/12.5 controlled.     Specialists:  Ortho: Dr Salamanca  Hand:  Dr Paulino     Smoke: None  ETOH: 5 a week  Exercise: Crossfit 4 days a week     HPI     ADHD Adult: On medications  Have difficulty sustaining attention in tasks or fun activities?  No  Don't follow through on instructions and fail to finish work?  No  Have difficulty organizing tasks and activities?  No  Avoid, dislike, or are reluctant to engage in work thar requires sustained mental effort? No  Easily distracted?  yes  Forgetful in daily activities?  yes   Fidget with hands or feet, or squirm in seat?  no  Have difficulty engaging in leisure activities or doing fun things quietly?  Yes  Feel "on the go" or "driven by a motor"?  no  Blurt out answers before questions have been completed?  no  Have difficulty waiting your turn, are impatient?  no  Interrupt or intrude on others?  no  HPI      Past Medical History:   Diagnosis Date    ADHD (attention deficit hyperactivity disorder)     Hypertension      Social History     Socioeconomic History    Marital status:    Tobacco Use    Smoking status: Never Smoker    Smokeless tobacco: Never Used   Substance and Sexual Activity    Alcohol use: Yes     Comment: occ    Drug use: Never    Sexual activity: Yes     Partners: Female     Past Surgical History:   Procedure Laterality Date    HERNIA REPAIR       Family History   Problem Relation Age of Onset    No Known Problems Mother     No Known Problems Father      Current Outpatient Medications " "  Medication Sig Dispense Refill    lisinopriL-hydrochlorothiazide (PRINZIDE,ZESTORETIC) 10-12.5 mg per tablet Take 1 tablet by mouth once daily. 90 tablet 3    lisdexamfetamine (VYVANSE) 40 MG Cap Take 1 capsule (40 mg total) by mouth once daily. 90 capsule 0     No current facility-administered medications for this visit.     Review of patient's allergies indicates:  No Known Allergies    Review of Systems   Constitutional: Negative for activity change, appetite change, fatigue and fever.   HENT: Negative for congestion, ear pain, hearing loss, postnasal drip, sinus pressure, sinus pain, sneezing and sore throat.    Eyes: Negative for photophobia and pain.   Respiratory: Negative for cough, chest tightness, shortness of breath and wheezing.    Cardiovascular: Negative for chest pain and leg swelling.   Gastrointestinal: Negative for abdominal distention, abdominal pain, blood in stool, constipation, diarrhea, nausea and vomiting.   Endocrine: Negative for cold intolerance, heat intolerance, polydipsia and polyuria.   Genitourinary: Negative for difficulty urinating, dysuria, flank pain, frequency, hematuria and urgency.   Musculoskeletal: Negative for arthralgias, back pain, joint swelling, myalgias and neck pain.   Skin: Negative for pallor and rash.   Allergic/Immunologic: Negative for environmental allergies and food allergies.   Neurological: Negative for dizziness, weakness, light-headedness and headaches.   Hematological: Does not bruise/bleed easily.   Psychiatric/Behavioral: Negative for confusion, decreased concentration and sleep disturbance. The patient is not nervous/anxious.           Blood pressure 122/76, pulse 77, temperature 98 °F (36.7 °C), temperature source Oral, height 6' 2" (1.88 m), weight 106.4 kg (234 lb 8 oz), SpO2 97 %. Body mass index is 30.11 kg/m².   Objective:      Physical Exam  Vitals and nursing note reviewed.   Constitutional:       General: He is awake. He is not in acute " distress.     Appearance: Normal appearance. He is obese. He is not ill-appearing, toxic-appearing or diaphoretic.   HENT:      Head: Normocephalic and atraumatic.      Right Ear: Tympanic membrane, ear canal and external ear normal. There is no impacted cerumen.      Left Ear: Tympanic membrane, ear canal and external ear normal. There is no impacted cerumen.      Nose: Nose normal. No congestion or rhinorrhea.      Mouth/Throat:      Lips: Pink.      Mouth: Mucous membranes are moist.      Pharynx: Oropharynx is clear. Uvula midline. No oropharyngeal exudate or posterior oropharyngeal erythema.      Comments: Mallampati score 1  Eyes:      General: Lids are normal. Gaze aligned appropriately. No scleral icterus.     Conjunctiva/sclera: Conjunctivae normal.      Right eye: Right conjunctiva is not injected.      Left eye: Left conjunctiva is not injected.      Pupils: Pupils are equal, round, and reactive to light.   Cardiovascular:      Rate and Rhythm: Normal rate and regular rhythm.      Pulses: Normal pulses.      Heart sounds: Normal heart sounds, S1 normal and S2 normal. No murmur heard.  Pulmonary:      Effort: Pulmonary effort is normal. No respiratory distress.      Breath sounds: Normal breath sounds. No stridor. No decreased breath sounds, wheezing, rhonchi or rales.   Chest:      Chest wall: No tenderness.   Abdominal:      General: Bowel sounds are normal.      Palpations: Abdomen is soft.      Tenderness: There is no abdominal tenderness.   Musculoskeletal:      Cervical back: Neck supple.      Right lower leg: No edema.      Left lower leg: No edema.   Lymphadenopathy:      Cervical: No cervical adenopathy.   Skin:     General: Skin is warm and dry.      Capillary Refill: Capillary refill takes less than 2 seconds.   Neurological:      Mental Status: He is alert and oriented to person, place, and time. Mental status is at baseline.   Psychiatric:         Attention and Perception: Attention normal.          Mood and Affect: Mood normal.         Speech: Speech normal.         Behavior: Behavior normal. Behavior is cooperative.         Thought Content: Thought content normal.         Judgment: Judgment normal.             Assessment:       1. Colon cancer screening    2. Attention deficit disorder (ADD) without hyperactivity         Plan:           Colon cancer screening  -     Ambulatory referral/consult to Gastroenterology; Future; Expected date: 06/24/2022    Attention deficit disorder (ADD) without hyperactivity  -     lisdexamfetamine (VYVANSE) 40 MG Cap; Take 1 capsule (40 mg total) by mouth once daily.  Dispense: 90 capsule; Refill: 0

## 2022-09-13 DIAGNOSIS — I10 ESSENTIAL HYPERTENSION: ICD-10-CM

## 2022-09-14 RX ORDER — LISINOPRIL AND HYDROCHLOROTHIAZIDE 10; 12.5 MG/1; MG/1
1 TABLET ORAL DAILY
Qty: 90 TABLET | Refills: 3 | Status: SHIPPED | OUTPATIENT
Start: 2022-09-14 | End: 2022-09-19 | Stop reason: ALTCHOICE

## 2022-09-19 ENCOUNTER — OFFICE VISIT (OUTPATIENT)
Dept: FAMILY MEDICINE | Facility: CLINIC | Age: 47
End: 2022-09-19
Payer: COMMERCIAL

## 2022-09-19 VITALS
SYSTOLIC BLOOD PRESSURE: 124 MMHG | DIASTOLIC BLOOD PRESSURE: 78 MMHG | BODY MASS INDEX: 29.77 KG/M2 | HEART RATE: 72 BPM | TEMPERATURE: 98 F | WEIGHT: 232 LBS | OXYGEN SATURATION: 99 % | HEIGHT: 74 IN

## 2022-09-19 DIAGNOSIS — Z12.11 COLON CANCER SCREENING: ICD-10-CM

## 2022-09-19 DIAGNOSIS — Z23 NEED FOR PROPHYLACTIC VACCINATION AGAINST DIPHTHERIA AND TETANUS: ICD-10-CM

## 2022-09-19 DIAGNOSIS — I10 ESSENTIAL HYPERTENSION: ICD-10-CM

## 2022-09-19 DIAGNOSIS — F98.8 ATTENTION DEFICIT DISORDER (ADD) WITHOUT HYPERACTIVITY: Primary | ICD-10-CM

## 2022-09-19 PROCEDURE — 4010F PR ACE/ARB THEARPY RXD/TAKEN: ICD-10-PCS | Mod: CPTII,S$GLB,, | Performed by: FAMILY MEDICINE

## 2022-09-19 PROCEDURE — 3074F SYST BP LT 130 MM HG: CPT | Mod: CPTII,S$GLB,, | Performed by: FAMILY MEDICINE

## 2022-09-19 PROCEDURE — 3008F PR BODY MASS INDEX (BMI) DOCUMENTED: ICD-10-PCS | Mod: CPTII,S$GLB,, | Performed by: FAMILY MEDICINE

## 2022-09-19 PROCEDURE — 90471 IMMUNIZATION ADMIN: CPT | Mod: S$GLB,,, | Performed by: FAMILY MEDICINE

## 2022-09-19 PROCEDURE — 90471 TDAP VACCINE GREATER THAN OR EQUAL TO 7YO IM: ICD-10-PCS | Mod: S$GLB,,, | Performed by: FAMILY MEDICINE

## 2022-09-19 PROCEDURE — 99214 PR OFFICE/OUTPT VISIT, EST, LEVL IV, 30-39 MIN: ICD-10-PCS | Mod: 25,S$GLB,, | Performed by: FAMILY MEDICINE

## 2022-09-19 PROCEDURE — 90715 TDAP VACCINE GREATER THAN OR EQUAL TO 7YO IM: ICD-10-PCS | Mod: S$GLB,,, | Performed by: FAMILY MEDICINE

## 2022-09-19 PROCEDURE — 1159F PR MEDICATION LIST DOCUMENTED IN MEDICAL RECORD: ICD-10-PCS | Mod: CPTII,S$GLB,, | Performed by: FAMILY MEDICINE

## 2022-09-19 PROCEDURE — 1159F MED LIST DOCD IN RCRD: CPT | Mod: CPTII,S$GLB,, | Performed by: FAMILY MEDICINE

## 2022-09-19 PROCEDURE — 90715 TDAP VACCINE 7 YRS/> IM: CPT | Mod: S$GLB,,, | Performed by: FAMILY MEDICINE

## 2022-09-19 PROCEDURE — 99214 OFFICE O/P EST MOD 30 MIN: CPT | Mod: 25,S$GLB,, | Performed by: FAMILY MEDICINE

## 2022-09-19 PROCEDURE — 4010F ACE/ARB THERAPY RXD/TAKEN: CPT | Mod: CPTII,S$GLB,, | Performed by: FAMILY MEDICINE

## 2022-09-19 PROCEDURE — 3074F PR MOST RECENT SYSTOLIC BLOOD PRESSURE < 130 MM HG: ICD-10-PCS | Mod: CPTII,S$GLB,, | Performed by: FAMILY MEDICINE

## 2022-09-19 PROCEDURE — 3008F BODY MASS INDEX DOCD: CPT | Mod: CPTII,S$GLB,, | Performed by: FAMILY MEDICINE

## 2022-09-19 PROCEDURE — 3078F PR MOST RECENT DIASTOLIC BLOOD PRESSURE < 80 MM HG: ICD-10-PCS | Mod: CPTII,S$GLB,, | Performed by: FAMILY MEDICINE

## 2022-09-19 PROCEDURE — 3078F DIAST BP <80 MM HG: CPT | Mod: CPTII,S$GLB,, | Performed by: FAMILY MEDICINE

## 2022-09-19 RX ORDER — LISDEXAMFETAMINE DIMESYLATE 40 MG/1
40 CAPSULE ORAL DAILY
Qty: 90 CAPSULE | Refills: 0 | Status: SHIPPED | OUTPATIENT
Start: 2022-11-19 | End: 2022-12-16 | Stop reason: SDUPTHER

## 2022-09-19 RX ORDER — LISINOPRIL 20 MG/1
20 TABLET ORAL DAILY
Qty: 90 TABLET | Refills: 3 | Status: SHIPPED | OUTPATIENT
Start: 2022-09-19 | End: 2023-09-12 | Stop reason: SDUPTHER

## 2022-09-19 RX ORDER — LISDEXAMFETAMINE DIMESYLATE 40 MG/1
40 CAPSULE ORAL DAILY
Qty: 90 CAPSULE | Refills: 0 | Status: SHIPPED | OUTPATIENT
Start: 2022-09-19 | End: 2022-12-16 | Stop reason: SDUPTHER

## 2022-09-19 RX ORDER — LISDEXAMFETAMINE DIMESYLATE 40 MG/1
40 CAPSULE ORAL DAILY
Qty: 90 CAPSULE | Refills: 0 | Status: SHIPPED | OUTPATIENT
Start: 2022-10-19 | End: 2022-12-16 | Stop reason: SDUPTHER

## 2022-09-19 NOTE — PROGRESS NOTES
"  SUBJECTIVE:    Patient ID: Lucas Figueroa is a 46 y.o. male.    Chief Complaint: Medication Refill and Follow-up  47 yo male here today to follow up on his ADD. .     Patient doing well on the Vyvanse patient states that his work is improved is improved his attitude home, he denies any chest pain shortness of breath weight loss dry mouth or headaches. Pt is concerned because he is having periodic elevated blood pressures at home, he denies any CP, SOB, or PUGH. Discussed cahnging his medications but would like to be cautious to help ensure no episodes of hypotension.       SPMHX:  Allergic Rhinitis: Zyrtec 10mg  ADD: Vyvanse 40mg  HTN: Started on Lisinopril/HCTZ 10/12.5 controlled.     Specialists:  Ortho: Dr Salamanca  Hand:  Dr Paulino     Smoke: None  ETOH: 5 a week  Exercise: Crossfit 4 days a week     HPI     ADHD Adult: On medications  Have difficulty sustaining attention in tasks or fun activities?  No  Don't follow through on instructions and fail to finish work?  No  Have difficulty organizing tasks and activities?  No  Avoid, dislike, or are reluctant to engage in work thar requires sustained mental effort? No  Easily distracted?  yes  Forgetful in daily activities?  yes   Fidget with hands or feet, or squirm in seat?  no  Have difficulty engaging in leisure activities or doing fun things quietly?  Yes  Feel "on the go" or "driven by a motor"?  no  Blurt out answers before questions have been completed?  no  Have difficulty waiting your turn, are impatient?  no  Interrupt or intrude on others?  no  Hypertension  This is a chronic problem. The current episode started more than 1 year ago. The problem is unchanged. The problem is controlled. Pertinent negatives include no anxiety, blurred vision, chest pain, headaches, malaise/fatigue, neck pain, orthopnea, palpitations, peripheral edema, PND, shortness of breath or sweats. There are no associated agents to hypertension. Risk factors for coronary artery " disease include male gender. Past treatments include ACE inhibitors and diuretics. The current treatment provides moderate improvement. There is no history of CAD/MI, CVA, heart failure or PVD.       Past Medical History:   Diagnosis Date    ADHD (attention deficit hyperactivity disorder)     Hypertension      Social History     Socioeconomic History    Marital status:    Tobacco Use    Smoking status: Never    Smokeless tobacco: Never   Substance and Sexual Activity    Alcohol use: Yes     Comment: occ    Drug use: Never    Sexual activity: Yes     Partners: Female     Past Surgical History:   Procedure Laterality Date    HERNIA REPAIR       Family History   Problem Relation Age of Onset    No Known Problems Mother     No Known Problems Father      Current Outpatient Medications   Medication Sig Dispense Refill    lisdexamfetamine (VYVANSE) 40 MG Cap Take 1 capsule (40 mg total) by mouth once daily. 90 capsule 0    [START ON 10/19/2022] lisdexamfetamine (VYVANSE) 40 MG Cap Take 1 capsule (40 mg total) by mouth once daily. 90 capsule 0    [START ON 11/19/2022] lisdexamfetamine (VYVANSE) 40 MG Cap Take 1 capsule (40 mg total) by mouth once daily. 90 capsule 0    lisinopriL (PRINIVIL,ZESTRIL) 20 MG tablet Take 1 tablet (20 mg total) by mouth once daily. 90 tablet 3     No current facility-administered medications for this visit.     Review of patient's allergies indicates:  No Known Allergies    Review of Systems   Constitutional:  Negative for activity change, appetite change, diaphoresis, fatigue, malaise/fatigue and unexpected weight change.   HENT:  Negative for congestion, postnasal drip, rhinorrhea, sinus pressure and sinus pain.    Eyes:  Negative for blurred vision.   Respiratory:  Negative for cough, chest tightness, shortness of breath and wheezing.    Cardiovascular:  Negative for chest pain, palpitations, orthopnea, leg swelling and PND.   Gastrointestinal:  Negative for abdominal distention,  "abdominal pain, anal bleeding, blood in stool, constipation and diarrhea.   Genitourinary:  Negative for dysuria, flank pain, frequency, hematuria and urgency.   Musculoskeletal:  Negative for neck pain.   Neurological:  Negative for headaches.        Blood pressure 124/78, pulse 72, temperature 97.7 °F (36.5 °C), temperature source Oral, height 6' 2" (1.88 m), weight 105.2 kg (232 lb), SpO2 99 %. Body mass index is 29.79 kg/m².   Objective:      Physical Exam  Vitals and nursing note reviewed.   Constitutional:       General: He is awake. He is not in acute distress.     Appearance: Normal appearance. He is obese. He is not ill-appearing, toxic-appearing or diaphoretic.   HENT:      Head: Normocephalic and atraumatic.      Right Ear: Tympanic membrane, ear canal and external ear normal. There is no impacted cerumen.      Left Ear: Tympanic membrane, ear canal and external ear normal. There is no impacted cerumen.      Nose: Nose normal. No congestion or rhinorrhea.      Mouth/Throat:      Lips: Pink.      Mouth: Mucous membranes are moist.      Pharynx: Oropharynx is clear. Uvula midline. No oropharyngeal exudate or posterior oropharyngeal erythema.      Comments: Mallampati score 1  Eyes:      General: Lids are normal. Gaze aligned appropriately. No scleral icterus.     Conjunctiva/sclera: Conjunctivae normal.      Right eye: Right conjunctiva is not injected.      Left eye: Left conjunctiva is not injected.      Pupils: Pupils are equal, round, and reactive to light.   Cardiovascular:      Rate and Rhythm: Normal rate and regular rhythm.      Pulses: Normal pulses.      Heart sounds: Normal heart sounds, S1 normal and S2 normal. No murmur heard.  Pulmonary:      Effort: Pulmonary effort is normal. No respiratory distress.      Breath sounds: Normal breath sounds. No stridor. No decreased breath sounds, wheezing, rhonchi or rales.   Chest:      Chest wall: No tenderness.   Abdominal:      General: Bowel sounds are " normal.      Palpations: Abdomen is soft.      Tenderness: There is no abdominal tenderness.   Musculoskeletal:      Cervical back: Neck supple.      Right lower leg: No edema.      Left lower leg: No edema.   Lymphadenopathy:      Cervical: No cervical adenopathy.   Skin:     General: Skin is warm and dry.      Capillary Refill: Capillary refill takes less than 2 seconds.   Neurological:      Mental Status: He is alert and oriented to person, place, and time. Mental status is at baseline.   Psychiatric:         Attention and Perception: Attention normal.         Mood and Affect: Mood normal.         Speech: Speech normal.         Behavior: Behavior normal. Behavior is cooperative.         Thought Content: Thought content normal.         Judgment: Judgment normal.           Assessment:       1. Attention deficit disorder (ADD) without hyperactivity    2. Essential hypertension    3. Need for prophylactic vaccination against diphtheria and tetanus    4. Colon cancer screening         Plan:           Attention deficit disorder (ADD) without hyperactivity  -     lisdexamfetamine (VYVANSE) 40 MG Cap; Take 1 capsule (40 mg total) by mouth once daily.  Dispense: 90 capsule; Refill: 0  -     lisdexamfetamine (VYVANSE) 40 MG Cap; Take 1 capsule (40 mg total) by mouth once daily.  Dispense: 90 capsule; Refill: 0  -     lisdexamfetamine (VYVANSE) 40 MG Cap; Take 1 capsule (40 mg total) by mouth once daily.  Dispense: 90 capsule; Refill: 0    Essential hypertension  -     lisinopriL (PRINIVIL,ZESTRIL) 20 MG tablet; Take 1 tablet (20 mg total) by mouth once daily.  Dispense: 90 tablet; Refill: 3    Need for prophylactic vaccination against diphtheria and tetanus  -     (In Office Administered) Tdap Vaccine    Colon cancer screening  -     Ambulatory referral/consult to Gastroenterology; Future; Expected date: 09/26/2022

## 2022-12-09 ENCOUNTER — HOSPITAL ENCOUNTER (EMERGENCY)
Facility: HOSPITAL | Age: 47
Discharge: ANOTHER HOSPITAL | End: 2022-12-09
Attending: EMERGENCY MEDICINE
Payer: COMMERCIAL

## 2022-12-09 VITALS
HEART RATE: 76 BPM | DIASTOLIC BLOOD PRESSURE: 83 MMHG | OXYGEN SATURATION: 100 % | RESPIRATION RATE: 21 BRPM | BODY MASS INDEX: 29.77 KG/M2 | SYSTOLIC BLOOD PRESSURE: 126 MMHG | HEIGHT: 74 IN | WEIGHT: 232 LBS | TEMPERATURE: 98 F

## 2022-12-09 DIAGNOSIS — S22.42XA CLOSED FRACTURE OF MULTIPLE RIBS OF LEFT SIDE, INITIAL ENCOUNTER: Primary | ICD-10-CM

## 2022-12-09 DIAGNOSIS — S27.0XXA TRAUMATIC PNEUMOTHORAX, INITIAL ENCOUNTER: ICD-10-CM
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
## 2022-12-09 DIAGNOSIS — S27.321A CONTUSION OF LEFT LUNG, INITIAL ENCOUNTER: ICD-10-CM

## 2022-12-09 LAB
ABO + RH BLD: NORMAL
ALBUMIN SERPL BCP-MCNC: 4.9 G/DL (ref 3.5–5.2)
ALP SERPL-CCNC: 73 U/L (ref 55–135)
ALT SERPL W/O P-5'-P-CCNC: 26 U/L (ref 10–44)
ANION GAP SERPL CALC-SCNC: 11 MMOL/L (ref 8–16)
AST SERPL-CCNC: 24 U/L (ref 10–40)
BASOPHILS # BLD AUTO: 0.04 K/UL (ref 0–0.2)
BASOPHILS NFR BLD: 0.3 % (ref 0–1.9)
BILIRUB SERPL-MCNC: 0.9 MG/DL (ref 0.1–1)
BLD GP AB SCN CELLS X3 SERPL QL: NORMAL
BUN SERPL-MCNC: 15 MG/DL (ref 6–20)
CALCIUM SERPL-MCNC: 9.5 MG/DL (ref 8.7–10.5)
CHLORIDE SERPL-SCNC: 102 MMOL/L (ref 95–110)
CO2 SERPL-SCNC: 25 MMOL/L (ref 23–29)
CREAT SERPL-MCNC: 1.2 MG/DL (ref 0.5–1.4)
DIFFERENTIAL METHOD: ABNORMAL
EOSINOPHIL # BLD AUTO: 0.2 K/UL (ref 0–0.5)
EOSINOPHIL NFR BLD: 1.3 % (ref 0–8)
ERYTHROCYTE [DISTWIDTH] IN BLOOD BY AUTOMATED COUNT: 13 % (ref 11.5–14.5)
EST. GFR  (NO RACE VARIABLE): >60 ML/MIN/1.73 M^2
ETHANOL SERPL-MCNC: <5 MG/DL
GLUCOSE SERPL-MCNC: 144 MG/DL (ref 70–110)
HCT VFR BLD AUTO: 41.7 % (ref 40–54)
HGB BLD-MCNC: 14.2 G/DL (ref 14–18)
IMM GRANULOCYTES # BLD AUTO: 0.05 K/UL (ref 0–0.04)
IMM GRANULOCYTES NFR BLD AUTO: 0.4 % (ref 0–0.5)
INR PPP: 1.1
LACTATE SERPL-SCNC: 1.2 MMOL/L (ref 0.5–1.9)
LYMPHOCYTES # BLD AUTO: 1.1 K/UL (ref 1–4.8)
LYMPHOCYTES NFR BLD: 8.7 % (ref 18–48)
MCH RBC QN AUTO: 31 PG (ref 27–31)
MCHC RBC AUTO-ENTMCNC: 34.1 G/DL (ref 32–36)
MCV RBC AUTO: 91 FL (ref 82–98)
MONOCYTES # BLD AUTO: 0.8 K/UL (ref 0.3–1)
MONOCYTES NFR BLD: 6.6 % (ref 4–15)
NEUTROPHILS # BLD AUTO: 10.1 K/UL (ref 1.8–7.7)
NEUTROPHILS NFR BLD: 82.7 % (ref 38–73)
NRBC BLD-RTO: 0 /100 WBC
PLATELET # BLD AUTO: 348 K/UL (ref 150–450)
PMV BLD AUTO: 9.1 FL (ref 9.2–12.9)
POTASSIUM SERPL-SCNC: 3.9 MMOL/L (ref 3.5–5.1)
PROT SERPL-MCNC: 8.2 G/DL (ref 6–8.4)
PROTHROMBIN TIME: 13.4 SEC (ref 11.4–13.7)
RBC # BLD AUTO: 4.58 M/UL (ref 4.6–6.2)
SODIUM SERPL-SCNC: 138 MMOL/L (ref 136–145)
WBC # BLD AUTO: 12.2 K/UL (ref 3.9–12.7)

## 2022-12-09 PROCEDURE — 85025 COMPLETE CBC W/AUTO DIFF WBC: CPT | Performed by: EMERGENCY MEDICINE

## 2022-12-09 PROCEDURE — 82077 ASSAY SPEC XCP UR&BREATH IA: CPT | Performed by: EMERGENCY MEDICINE

## 2022-12-09 PROCEDURE — 83605 ASSAY OF LACTIC ACID: CPT | Performed by: EMERGENCY MEDICINE

## 2022-12-09 PROCEDURE — 86901 BLOOD TYPING SEROLOGIC RH(D): CPT | Performed by: EMERGENCY MEDICINE

## 2022-12-09 PROCEDURE — 96361 HYDRATE IV INFUSION ADD-ON: CPT

## 2022-12-09 PROCEDURE — 99285 EMERGENCY DEPT VISIT HI MDM: CPT | Mod: 25

## 2022-12-09 PROCEDURE — 96375 TX/PRO/DX INJ NEW DRUG ADDON: CPT

## 2022-12-09 PROCEDURE — 63600175 PHARM REV CODE 636 W HCPCS: Performed by: NURSE PRACTITIONER

## 2022-12-09 PROCEDURE — 25500020 PHARM REV CODE 255: Performed by: EMERGENCY MEDICINE

## 2022-12-09 PROCEDURE — 80053 COMPREHEN METABOLIC PANEL: CPT | Performed by: EMERGENCY MEDICINE

## 2022-12-09 PROCEDURE — 25000003 PHARM REV CODE 250: Performed by: EMERGENCY MEDICINE

## 2022-12-09 PROCEDURE — 96374 THER/PROPH/DIAG INJ IV PUSH: CPT

## 2022-12-09 PROCEDURE — 85610 PROTHROMBIN TIME: CPT | Performed by: EMERGENCY MEDICINE

## 2022-12-09 RX ORDER — ONDANSETRON 2 MG/ML
4 INJECTION INTRAMUSCULAR; INTRAVENOUS
Status: COMPLETED | OUTPATIENT
Start: 2022-12-09 | End: 2022-12-09

## 2022-12-09 RX ORDER — MORPHINE SULFATE 4 MG/ML
4 INJECTION, SOLUTION INTRAMUSCULAR; INTRAVENOUS
Status: COMPLETED | OUTPATIENT
Start: 2022-12-09 | End: 2022-12-09

## 2022-12-09 RX ADMIN — ONDANSETRON 4 MG: 2 INJECTION INTRAMUSCULAR; INTRAVENOUS at 06:12

## 2022-12-09 RX ADMIN — SODIUM CHLORIDE 500 ML: 0.9 INJECTION, SOLUTION INTRAVENOUS at 09:12

## 2022-12-09 RX ADMIN — MORPHINE SULFATE 4 MG: 4 INJECTION, SOLUTION INTRAMUSCULAR; INTRAVENOUS at 06:12

## 2022-12-09 RX ADMIN — IOHEXOL 100 ML: 350 INJECTION, SOLUTION INTRAVENOUS at 08:12

## 2022-12-10 NOTE — ED NOTES
Pt alert and oriented and in NAD. VS stable. No needs voiced at this time. Call light within reach.

## 2022-12-10 NOTE — ED NOTES
Pt encouraged to urinate. Non re breather placed and pt sat up to use urinal. Poc and education given to pt.
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
## 2022-12-16 ENCOUNTER — OFFICE VISIT (OUTPATIENT)
Dept: FAMILY MEDICINE | Facility: CLINIC | Age: 47
End: 2022-12-16
Payer: COMMERCIAL

## 2022-12-16 VITALS
HEART RATE: 82 BPM | BODY MASS INDEX: 30.08 KG/M2 | HEIGHT: 74 IN | OXYGEN SATURATION: 100 % | WEIGHT: 234.38 LBS | SYSTOLIC BLOOD PRESSURE: 128 MMHG | DIASTOLIC BLOOD PRESSURE: 80 MMHG

## 2022-12-16 DIAGNOSIS — F98.8 ATTENTION DEFICIT DISORDER (ADD) WITHOUT HYPERACTIVITY: ICD-10-CM

## 2022-12-16 DIAGNOSIS — M25.512 ACUTE PAIN OF LEFT SHOULDER: Primary | ICD-10-CM

## 2022-12-16 PROCEDURE — 3074F SYST BP LT 130 MM HG: CPT | Mod: CPTII,S$GLB,, | Performed by: FAMILY MEDICINE

## 2022-12-16 PROCEDURE — 4010F ACE/ARB THERAPY RXD/TAKEN: CPT | Mod: CPTII,S$GLB,, | Performed by: FAMILY MEDICINE

## 2022-12-16 PROCEDURE — 3079F PR MOST RECENT DIASTOLIC BLOOD PRESSURE 80-89 MM HG: ICD-10-PCS | Mod: CPTII,S$GLB,, | Performed by: FAMILY MEDICINE

## 2022-12-16 PROCEDURE — 1159F PR MEDICATION LIST DOCUMENTED IN MEDICAL RECORD: ICD-10-PCS | Mod: CPTII,S$GLB,, | Performed by: FAMILY MEDICINE

## 2022-12-16 PROCEDURE — 99214 OFFICE O/P EST MOD 30 MIN: CPT | Mod: S$GLB,,, | Performed by: FAMILY MEDICINE

## 2022-12-16 PROCEDURE — 4010F PR ACE/ARB THEARPY RXD/TAKEN: ICD-10-PCS | Mod: CPTII,S$GLB,, | Performed by: FAMILY MEDICINE

## 2022-12-16 PROCEDURE — 3008F BODY MASS INDEX DOCD: CPT | Mod: CPTII,S$GLB,, | Performed by: FAMILY MEDICINE

## 2022-12-16 PROCEDURE — 3008F PR BODY MASS INDEX (BMI) DOCUMENTED: ICD-10-PCS | Mod: CPTII,S$GLB,, | Performed by: FAMILY MEDICINE

## 2022-12-16 PROCEDURE — 1159F MED LIST DOCD IN RCRD: CPT | Mod: CPTII,S$GLB,, | Performed by: FAMILY MEDICINE

## 2022-12-16 PROCEDURE — 99214 PR OFFICE/OUTPT VISIT, EST, LEVL IV, 30-39 MIN: ICD-10-PCS | Mod: S$GLB,,, | Performed by: FAMILY MEDICINE

## 2022-12-16 PROCEDURE — 3079F DIAST BP 80-89 MM HG: CPT | Mod: CPTII,S$GLB,, | Performed by: FAMILY MEDICINE

## 2022-12-16 PROCEDURE — 3074F PR MOST RECENT SYSTOLIC BLOOD PRESSURE < 130 MM HG: ICD-10-PCS | Mod: CPTII,S$GLB,, | Performed by: FAMILY MEDICINE

## 2022-12-16 RX ORDER — HYDROCODONE BITARTRATE AND ACETAMINOPHEN 5; 325 MG/1; MG/1
TABLET ORAL
COMMUNITY
Start: 2022-12-14 | End: 2023-06-14

## 2022-12-16 RX ORDER — LISDEXAMFETAMINE DIMESYLATE 40 MG/1
40 CAPSULE ORAL DAILY
Qty: 90 CAPSULE | Refills: 0 | Status: SHIPPED | OUTPATIENT
Start: 2022-12-16 | End: 2023-03-14 | Stop reason: SDUPTHER

## 2022-12-16 RX ORDER — LISDEXAMFETAMINE DIMESYLATE 40 MG/1
40 CAPSULE ORAL DAILY
Qty: 90 CAPSULE | Refills: 0 | Status: SHIPPED | OUTPATIENT
Start: 2023-02-16 | End: 2023-03-14 | Stop reason: SDUPTHER

## 2022-12-16 RX ORDER — METHOCARBAMOL 500 MG/1
500 TABLET, FILM COATED ORAL 4 TIMES DAILY
COMMUNITY
Start: 2022-12-14 | End: 2023-06-14

## 2022-12-16 RX ORDER — ONDANSETRON 4 MG/1
4 TABLET, ORALLY DISINTEGRATING ORAL EVERY 6 HOURS PRN
COMMUNITY
Start: 2022-12-14 | End: 2023-06-14

## 2022-12-16 RX ORDER — FERROUS SULFATE, DRIED 160(50) MG
1 TABLET, EXTENDED RELEASE ORAL
COMMUNITY
Start: 2022-12-14 | End: 2023-06-14

## 2022-12-16 RX ORDER — DOCUSATE SODIUM 100 MG/1
100 CAPSULE, LIQUID FILLED ORAL 2 TIMES DAILY
COMMUNITY
Start: 2022-12-14 | End: 2023-06-14

## 2022-12-16 RX ORDER — LISDEXAMFETAMINE DIMESYLATE 40 MG/1
40 CAPSULE ORAL DAILY
Qty: 90 CAPSULE | Refills: 0 | Status: SHIPPED | OUTPATIENT
Start: 2023-01-16 | End: 2023-03-14 | Stop reason: SDUPTHER

## 2022-12-16 RX ORDER — OXYCODONE HYDROCHLORIDE 5 MG/1
TABLET ORAL
COMMUNITY
Start: 2022-12-15 | End: 2023-06-14
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
## 2022-12-21 ENCOUNTER — TELEPHONE (OUTPATIENT)
Dept: FAMILY MEDICINE | Facility: CLINIC | Age: 47
End: 2022-12-21

## 2022-12-21 DIAGNOSIS — M25.512 ACUTE PAIN OF LEFT SHOULDER: Primary | ICD-10-CM

## 2022-12-21 NOTE — TELEPHONE ENCOUNTER
----- Message from Marquita Plascencia sent at 12/21/2022  2:25 PM CST -----  Regarding: referral  Pt called requesting a new referral to another PT office. He would like to go to [facility].     [facility]  [address] [place] [address]  Ph. [phone]  Fax. [phone]

## 2022-12-21 NOTE — TELEPHONE ENCOUNTER
----- Message from Jennifer Pantoja sent at 12/21/2022  4:07 PM CST -----  Regarding: Need corrected order  Dr Plaza Staff    The IR Arthrogram is not ordered correctly:    please change order to [id number] XR Arthrogram Shoulder Left w MRI to follow and   [id number] MRI Arthrogram Shoulder W Contrast Left    I know it;s confusing, please call me if you have any questions , thanks Jennifer

## 2023-01-11 ENCOUNTER — TELEPHONE (OUTPATIENT)
Dept: FAMILY MEDICINE | Facility: CLINIC | Age: 48
End: 2023-01-11

## 2023-01-11 DIAGNOSIS — M25.512 ACUTE PAIN OF LEFT SHOULDER: Primary | ICD-10-CM

## 2023-01-11 NOTE — TELEPHONE ENCOUNTER
----- Message from Inez Velez sent at 1/9/2023 11:53 AM CST -----  Regarding: 3rd and Final Request - needing updated arthrogram order  We received and order for Lucas Figueroa to have an MRI arthrogram of his left shoulder back in December.This is our 3rd request for a new order. The current order is incorrect and needs to be placed using the following Img #'s - [id number] and [id number]. Once both orders are placed, we can contact the patient to schedule. We have removed the original orders from our work queue so these will no longer be followed up on.     We appreciate any help you can provide in getting this patient scheduled.     Thanks,  Inez

## 2023-01-31 ENCOUNTER — HOSPITAL ENCOUNTER (OUTPATIENT)
Dept: RADIOLOGY | Facility: HOSPITAL | Age: 48
Discharge: HOME | End: 2023-01-31
Attending: FAMILY MEDICINE
Payer: COMMERCIAL

## 2023-01-31 DIAGNOSIS — M25.512 ACUTE PAIN OF LEFT SHOULDER: ICD-10-CM

## 2023-02-02 ENCOUNTER — TELEPHONE (OUTPATIENT)
Dept: FAMILY MEDICINE | Facility: CLINIC | Age: 48
End: 2023-02-02

## 2023-02-02 NOTE — TELEPHONE ENCOUNTER
----- Message from Inez Velez sent at 1/31/2023  2:12 PM CST -----  Regarding: MRI Arthrogram Order  This patient came in for his MRI arthrogram today. The MRI was ordered and authorized for WO Contrast but needs to be With contrast. Can you update the order to be with contrast instead of WO so that we can update the authorization and reschedule the patient?     Thanks,  Inez

## 2023-03-14 ENCOUNTER — OFFICE VISIT (OUTPATIENT)
Dept: FAMILY MEDICINE | Facility: CLINIC | Age: 48
End: 2023-03-14
Payer: COMMERCIAL

## 2023-03-14 VITALS
BODY MASS INDEX: 30.71 KG/M2 | HEART RATE: 86 BPM | DIASTOLIC BLOOD PRESSURE: 81 MMHG | WEIGHT: 239.31 LBS | OXYGEN SATURATION: 99 % | HEIGHT: 74 IN | SYSTOLIC BLOOD PRESSURE: 115 MMHG

## 2023-03-14 DIAGNOSIS — F98.8 ATTENTION DEFICIT DISORDER (ADD) WITHOUT HYPERACTIVITY: ICD-10-CM

## 2023-03-14 PROCEDURE — 99213 PR OFFICE/OUTPT VISIT, EST, LEVL III, 20-29 MIN: ICD-10-PCS | Mod: S$GLB,,, | Performed by: FAMILY MEDICINE

## 2023-03-14 PROCEDURE — 99213 OFFICE O/P EST LOW 20 MIN: CPT | Mod: S$GLB,,, | Performed by: FAMILY MEDICINE

## 2023-03-14 PROCEDURE — 1159F PR MEDICATION LIST DOCUMENTED IN MEDICAL RECORD: ICD-10-PCS | Mod: CPTII,S$GLB,, | Performed by: FAMILY MEDICINE

## 2023-03-14 PROCEDURE — 3074F PR MOST RECENT SYSTOLIC BLOOD PRESSURE < 130 MM HG: ICD-10-PCS | Mod: CPTII,S$GLB,, | Performed by: FAMILY MEDICINE

## 2023-03-14 PROCEDURE — 3008F PR BODY MASS INDEX (BMI) DOCUMENTED: ICD-10-PCS | Mod: CPTII,S$GLB,, | Performed by: FAMILY MEDICINE

## 2023-03-14 PROCEDURE — 3079F PR MOST RECENT DIASTOLIC BLOOD PRESSURE 80-89 MM HG: ICD-10-PCS | Mod: CPTII,S$GLB,, | Performed by: FAMILY MEDICINE

## 2023-03-14 PROCEDURE — 1159F MED LIST DOCD IN RCRD: CPT | Mod: CPTII,S$GLB,, | Performed by: FAMILY MEDICINE

## 2023-03-14 PROCEDURE — 3079F DIAST BP 80-89 MM HG: CPT | Mod: CPTII,S$GLB,, | Performed by: FAMILY MEDICINE

## 2023-03-14 PROCEDURE — 3074F SYST BP LT 130 MM HG: CPT | Mod: CPTII,S$GLB,, | Performed by: FAMILY MEDICINE

## 2023-03-14 PROCEDURE — 3008F BODY MASS INDEX DOCD: CPT | Mod: CPTII,S$GLB,, | Performed by: FAMILY MEDICINE

## 2023-03-14 RX ORDER — LISDEXAMFETAMINE DIMESYLATE 40 MG/1
40 CAPSULE ORAL DAILY
Qty: 90 CAPSULE | Refills: 0 | Status: SHIPPED | OUTPATIENT
Start: 2023-05-14 | End: 2023-06-14 | Stop reason: SDUPTHER

## 2023-03-14 RX ORDER — LISDEXAMFETAMINE DIMESYLATE 40 MG/1
40 CAPSULE ORAL DAILY
Qty: 90 CAPSULE | Refills: 0 | Status: SHIPPED | OUTPATIENT
Start: 2023-03-14 | End: 2023-06-14 | Stop reason: SDUPTHER

## 2023-03-14 RX ORDER — LISDEXAMFETAMINE DIMESYLATE 40 MG/1
40 CAPSULE ORAL DAILY
Qty: 90 CAPSULE | Refills: 0 | Status: SHIPPED | OUTPATIENT
Start: 2023-04-14 | End: 2023-06-14 | Stop reason: SDUPTHER

## 2023-03-14 NOTE — PROGRESS NOTES
"  SUBJECTIVE:    Patient ID: Lucas Figueroa is a 47 y.o. male.    Chief Complaint: ADD and Medication Refill  47 yo male here today to follow up on his ADD. Patient doing well on the Vyvanse patient states that his work is improved is improved his attitude home, he denies any chest pain shortness of breath weight loss dry mouth or headaches.       SPMHX:  Allergic Rhinitis: Zyrtec 10mg  ADD: Vyvanse 40mg  HTN: Started on Lisinopril/HCTZ 10/12.5 controlled.     Specialists:  Ortho: Dr Salamanca  Hand:  Dr Paulino     Smoke: None  ETOH: 5 a week  Exercise: Crossfit 4 days a week    HPI  ADHD Adult: On medications  Have difficulty sustaining attention in tasks or fun activities?  No  Don't follow through on instructions and fail to finish work?  No  Have difficulty organizing tasks and activities?  No  Avoid, dislike, or are reluctant to engage in work thar requires sustained mental effort? No  Easily distracted?  yes  Forgetful in daily activities?  yes   Fidget with hands or feet, or squirm in seat?  no  Have difficulty engaging in leisure activities or doing fun things quietly?  Yes  Feel "on the go" or "driven by a motor"?  no  Blurt out answers before questions have been completed?  no  Have difficulty waiting your turn, are impatient?  no  Interrupt or intrude on others?  no    Past Medical History:   Diagnosis Date    ADHD (attention deficit hyperactivity disorder)     Hypertension      Social History     Socioeconomic History    Marital status:    Tobacco Use    Smoking status: Never    Smokeless tobacco: Never   Substance and Sexual Activity    Alcohol use: Yes     Comment: occ    Drug use: Never    Sexual activity: Yes     Partners: Female     Past Surgical History:   Procedure Laterality Date    HERNIA REPAIR       Family History   Problem Relation Age of Onset    No Known Problems Mother     Melanoma Father      Current Outpatient Medications   Medication Sig Dispense Refill    calcium-vitamin D3 " "(OS-CRISELDA 500 + D3) 500 mg-5 mcg (200 unit) per tablet Take 1 tablet by mouth.      docusate sodium (COLACE) 100 MG capsule Take 100 mg by mouth 2 (two) times daily.      gabapentin (NEURONTIN) 300 MG capsule       HYDROcodone-acetaminophen (NORCO) 5-325 mg per tablet Take by mouth.      lisinopriL (PRINIVIL,ZESTRIL) 20 MG tablet Take 1 tablet (20 mg total) by mouth once daily. 90 tablet 3    methocarbamoL (ROBAXIN) 500 MG Tab Take 500 mg by mouth 4 (four) times daily.      ondansetron (ZOFRAN-ODT) 4 MG TbDL Take 4 mg by mouth every 6 (six) hours as needed.      oxyCODONE (ROXICODONE) 5 MG immediate release tablet Take by mouth.      lisdexamfetamine (VYVANSE) 40 MG Cap Take 1 capsule (40 mg total) by mouth once daily. 90 capsule 0    [START ON 4/14/2023] lisdexamfetamine (VYVANSE) 40 MG Cap Take 1 capsule (40 mg total) by mouth once daily. 90 capsule 0    [START ON 5/14/2023] lisdexamfetamine (VYVANSE) 40 MG Cap Take 1 capsule (40 mg total) by mouth once daily. 90 capsule 0     No current facility-administered medications for this visit.     Review of patient's allergies indicates:  No Known Allergies    Review of Systems   Constitutional:  Negative for activity change, appetite change, diaphoresis and fever.   HENT:  Negative for congestion, nosebleeds, postnasal drip, rhinorrhea and sinus pain.    Respiratory:  Negative for cough, choking, chest tightness, shortness of breath and wheezing.    Cardiovascular:  Negative for chest pain and palpitations.   Gastrointestinal:  Negative for abdominal distention, abdominal pain and anal bleeding.   Genitourinary:  Negative for flank pain, frequency, hematuria and urgency.   Musculoskeletal:  Positive for arthralgias.   Neurological:  Negative for numbness and headaches.        Blood pressure 115/81, pulse 86, height 6' 2" (1.88 m), weight 108.5 kg (239 lb 4.8 oz), SpO2 99 %. Body mass index is 30.72 kg/m².   Objective:      Physical Exam  Vitals and nursing note reviewed. "   Constitutional:       General: He is awake. He is not in acute distress.     Appearance: Normal appearance. He is obese. He is not ill-appearing, toxic-appearing or diaphoretic.   HENT:      Head: Normocephalic and atraumatic.      Right Ear: Tympanic membrane, ear canal and external ear normal. There is no impacted cerumen.      Left Ear: Tympanic membrane, ear canal and external ear normal. There is no impacted cerumen.      Nose: Nose normal. No congestion or rhinorrhea.      Mouth/Throat:      Lips: Pink.      Mouth: Mucous membranes are moist.      Pharynx: Oropharynx is clear. Uvula midline. No oropharyngeal exudate or posterior oropharyngeal erythema.      Comments: Mallampati score 1  Eyes:      General: Lids are normal. Gaze aligned appropriately. No scleral icterus.     Conjunctiva/sclera: Conjunctivae normal.      Right eye: Right conjunctiva is not injected.      Left eye: Left conjunctiva is not injected.      Pupils: Pupils are equal, round, and reactive to light.   Cardiovascular:      Rate and Rhythm: Normal rate and regular rhythm.      Pulses: Normal pulses.      Heart sounds: Normal heart sounds, S1 normal and S2 normal. No murmur heard.  Pulmonary:      Effort: Pulmonary effort is normal. No respiratory distress.      Breath sounds: Normal breath sounds. No stridor. No decreased breath sounds, wheezing, rhonchi or rales.   Chest:      Chest wall: No tenderness.   Abdominal:      General: Bowel sounds are normal.      Palpations: Abdomen is soft.      Tenderness: There is no abdominal tenderness.   Musculoskeletal:      Cervical back: Neck supple.      Right lower leg: No edema.      Left lower leg: No edema.   Lymphadenopathy:      Cervical: No cervical adenopathy.   Skin:     General: Skin is warm and dry.      Capillary Refill: Capillary refill takes less than 2 seconds.   Neurological:      Mental Status: He is alert and oriented to person, place, and time. Mental status is at baseline.    Psychiatric:         Attention and Perception: Attention normal.         Mood and Affect: Mood normal.         Speech: Speech normal.         Behavior: Behavior normal. Behavior is cooperative.         Thought Content: Thought content normal.         Judgment: Judgment normal.           Assessment:       1. Attention deficit disorder (ADD) without hyperactivity         Plan:           Attention deficit disorder (ADD) without hyperactivity  -     lisdexamfetamine (VYVANSE) 40 MG Cap; Take 1 capsule (40 mg total) by mouth once daily.  Dispense: 90 capsule; Refill: 0  -     lisdexamfetamine (VYVANSE) 40 MG Cap; Take 1 capsule (40 mg total) by mouth once daily.  Dispense: 90 capsule; Refill: 0  -     lisdexamfetamine (VYVANSE) 40 MG Cap; Take 1 capsule (40 mg total) by mouth once daily.  Dispense: 90 capsule; Refill: 0

## 2023-06-14 ENCOUNTER — OFFICE VISIT (OUTPATIENT)
Dept: FAMILY MEDICINE | Facility: CLINIC | Age: 48
End: 2023-06-14
Payer: COMMERCIAL

## 2023-06-14 VITALS
BODY MASS INDEX: 31.1 KG/M2 | SYSTOLIC BLOOD PRESSURE: 131 MMHG | DIASTOLIC BLOOD PRESSURE: 86 MMHG | WEIGHT: 242.31 LBS | HEIGHT: 74 IN | OXYGEN SATURATION: 97 % | HEART RATE: 104 BPM

## 2023-06-14 DIAGNOSIS — F98.8 ATTENTION DEFICIT DISORDER (ADD) WITHOUT HYPERACTIVITY: Primary | ICD-10-CM

## 2023-06-14 DIAGNOSIS — I10 ESSENTIAL HYPERTENSION: ICD-10-CM

## 2023-06-14 PROCEDURE — 1159F PR MEDICATION LIST DOCUMENTED IN MEDICAL RECORD: ICD-10-PCS | Mod: CPTII,S$GLB,, | Performed by: FAMILY MEDICINE

## 2023-06-14 PROCEDURE — 99213 PR OFFICE/OUTPT VISIT, EST, LEVL III, 20-29 MIN: ICD-10-PCS | Mod: S$GLB,,, | Performed by: FAMILY MEDICINE

## 2023-06-14 PROCEDURE — 1159F MED LIST DOCD IN RCRD: CPT | Mod: CPTII,S$GLB,, | Performed by: FAMILY MEDICINE

## 2023-06-14 PROCEDURE — 3079F PR MOST RECENT DIASTOLIC BLOOD PRESSURE 80-89 MM HG: ICD-10-PCS | Mod: CPTII,S$GLB,, | Performed by: FAMILY MEDICINE

## 2023-06-14 PROCEDURE — 4010F ACE/ARB THERAPY RXD/TAKEN: CPT | Mod: CPTII,S$GLB,, | Performed by: FAMILY MEDICINE

## 2023-06-14 PROCEDURE — 3008F PR BODY MASS INDEX (BMI) DOCUMENTED: ICD-10-PCS | Mod: CPTII,S$GLB,, | Performed by: FAMILY MEDICINE

## 2023-06-14 PROCEDURE — 3079F DIAST BP 80-89 MM HG: CPT | Mod: CPTII,S$GLB,, | Performed by: FAMILY MEDICINE

## 2023-06-14 PROCEDURE — 4010F PR ACE/ARB THEARPY RXD/TAKEN: ICD-10-PCS | Mod: CPTII,S$GLB,, | Performed by: FAMILY MEDICINE

## 2023-06-14 PROCEDURE — 3075F PR MOST RECENT SYSTOLIC BLOOD PRESS GE 130-139MM HG: ICD-10-PCS | Mod: CPTII,S$GLB,, | Performed by: FAMILY MEDICINE

## 2023-06-14 PROCEDURE — 3008F BODY MASS INDEX DOCD: CPT | Mod: CPTII,S$GLB,, | Performed by: FAMILY MEDICINE

## 2023-06-14 PROCEDURE — 3075F SYST BP GE 130 - 139MM HG: CPT | Mod: CPTII,S$GLB,, | Performed by: FAMILY MEDICINE

## 2023-06-14 PROCEDURE — 99213 OFFICE O/P EST LOW 20 MIN: CPT | Mod: S$GLB,,, | Performed by: FAMILY MEDICINE

## 2023-06-14 RX ORDER — LISDEXAMFETAMINE DIMESYLATE 40 MG/1
40 CAPSULE ORAL DAILY
Qty: 90 CAPSULE | Refills: 0 | Status: SHIPPED | OUTPATIENT
Start: 2023-06-14 | End: 2023-09-12

## 2023-06-14 RX ORDER — LISDEXAMFETAMINE DIMESYLATE 40 MG/1
40 CAPSULE ORAL DAILY
Qty: 90 CAPSULE | Refills: 0 | Status: SHIPPED | OUTPATIENT
Start: 2023-08-14 | End: 2023-09-12 | Stop reason: SDUPTHER

## 2023-06-14 RX ORDER — LISDEXAMFETAMINE DIMESYLATE 40 MG/1
40 CAPSULE ORAL DAILY
Qty: 90 CAPSULE | Refills: 0 | Status: SHIPPED | OUTPATIENT
Start: 2023-07-14 | End: 2023-09-12

## 2023-06-14 NOTE — PROGRESS NOTES
"  SUBJECTIVE:    Patient ID: Lucas Figueroa is a 47 y.o. male.    Chief Complaint: ADHD  48 yo male here today to follow up on his ADD. Patient doing well on the Vyvanse patient states that his work is improved is improved his attitude home, he denies any chest pain shortness of breath weight loss dry mouth or headaches.       SPMHX:  Allergic Rhinitis: Zyrtec 10mg  ADD: Vyvanse 40mg  HTN: Started on Lisinopril/HCTZ 10/12.5 controlled.     Specialists:  Ortho: Dr Salamanca  Hand:  Dr Paulino     Smoke: None  ETOH: 5 a week  Exercise: Crossfit 4 days a week       Hypertension  This is a chronic problem. The current episode started more than 1 year ago. The problem is unchanged. The problem is controlled. Pertinent negatives include no anxiety, blurred vision, chest pain, headaches, malaise/fatigue, neck pain, orthopnea, palpitations, peripheral edema, PND, shortness of breath or sweats. There are no associated agents to hypertension. There are no known risk factors for coronary artery disease. Past treatments include ACE inhibitors and diuretics. The current treatment provides significant improvement.     ADHD Adult: On medications  Have difficulty sustaining attention in tasks or fun activities?  No  Don't follow through on instructions and fail to finish work?  No  Have difficulty organizing tasks and activities?  No  Avoid, dislike, or are reluctant to engage in work thar requires sustained mental effort? No  Easily distracted?  yes  Forgetful in daily activities?  yes   Fidget with hands or feet, or squirm in seat?  no  Have difficulty engaging in leisure activities or doing fun things quietly?  Yes  Feel "on the go" or "driven by a motor"?  no  Blurt out answers before questions have been completed?  no  Have difficulty waiting your turn, are impatient?  no  Interrupt or intrude on others?  no    Past Medical History:   Diagnosis Date    ADHD (attention deficit hyperactivity disorder)     Hypertension      Social " "History     Socioeconomic History    Marital status:    Tobacco Use    Smoking status: Never    Smokeless tobacco: Never   Substance and Sexual Activity    Alcohol use: Yes     Comment: occ    Drug use: Never    Sexual activity: Yes     Partners: Female     Past Surgical History:   Procedure Laterality Date    HERNIA REPAIR       Family History   Problem Relation Age of Onset    No Known Problems Mother     Melanoma Father      Current Outpatient Medications   Medication Sig Dispense Refill    lisinopriL (PRINIVIL,ZESTRIL) 20 MG tablet Take 1 tablet (20 mg total) by mouth once daily. 90 tablet 3    lisdexamfetamine (VYVANSE) 40 MG Cap Take 1 capsule (40 mg total) by mouth once daily. 90 capsule 0    [START ON 7/14/2023] lisdexamfetamine (VYVANSE) 40 MG Cap Take 1 capsule (40 mg total) by mouth once daily. 90 capsule 0    [START ON 8/14/2023] lisdexamfetamine (VYVANSE) 40 MG Cap Take 1 capsule (40 mg total) by mouth once daily. 90 capsule 0     No current facility-administered medications for this visit.     Review of patient's allergies indicates:  No Known Allergies    Review of Systems   Constitutional:  Negative for activity change, appetite change, diaphoresis, fatigue, malaise/fatigue and unexpected weight change.   HENT:  Negative for congestion, rhinorrhea, sinus pressure and sinus pain.    Eyes:  Negative for blurred vision.   Respiratory:  Negative for cough, chest tightness, shortness of breath and wheezing.    Cardiovascular:  Negative for chest pain, palpitations, orthopnea and PND.   Genitourinary:  Negative for dysuria, flank pain, frequency and urgency.   Musculoskeletal:  Negative for neck pain.   Neurological:  Negative for headaches.        Blood pressure 131/86, pulse 104, height 6' 2" (1.88 m), weight 109.9 kg (242 lb 4.8 oz), SpO2 97 %. Body mass index is 31.11 kg/m².   Objective:      Physical Exam  Vitals reviewed.   Constitutional:       General: He is not in acute distress.     " Appearance: Normal appearance. He is not ill-appearing or toxic-appearing.   HENT:      Head: Normocephalic and atraumatic.      Right Ear: Tympanic membrane normal.      Left Ear: Tympanic membrane normal.   Cardiovascular:      Rate and Rhythm: Normal rate and regular rhythm.      Heart sounds: Normal heart sounds. No murmur heard.  Pulmonary:      Effort: Pulmonary effort is normal. No respiratory distress.      Breath sounds: Normal breath sounds. No wheezing or rhonchi.   Skin:     General: Skin is warm and dry.      Capillary Refill: Capillary refill takes less than 2 seconds.   Neurological:      Mental Status: He is alert and oriented to person, place, and time.           Assessment:       1. Attention deficit disorder (ADD) without hyperactivity    2. Essential hypertension         Plan:           Attention deficit disorder (ADD) without hyperactivity  -     lisdexamfetamine (VYVANSE) 40 MG Cap; Take 1 capsule (40 mg total) by mouth once daily.  Dispense: 90 capsule; Refill: 0  -     lisdexamfetamine (VYVANSE) 40 MG Cap; Take 1 capsule (40 mg total) by mouth once daily.  Dispense: 90 capsule; Refill: 0  -     lisdexamfetamine (VYVANSE) 40 MG Cap; Take 1 capsule (40 mg total) by mouth once daily.  Dispense: 90 capsule; Refill: 0    Essential hypertension    Reduce the amount of salt in your diet; Lose weight; Avoid drinking too much alcohol; Exercise at least 30 minutes per day most days of the week.  Continue current medications and home BP monitoring.

## 2023-09-12 ENCOUNTER — OFFICE VISIT (OUTPATIENT)
Dept: FAMILY MEDICINE | Facility: CLINIC | Age: 48
End: 2023-09-12
Payer: COMMERCIAL

## 2023-09-12 VITALS
HEIGHT: 74 IN | SYSTOLIC BLOOD PRESSURE: 118 MMHG | HEART RATE: 86 BPM | WEIGHT: 242.81 LBS | DIASTOLIC BLOOD PRESSURE: 86 MMHG | OXYGEN SATURATION: 100 % | BODY MASS INDEX: 31.16 KG/M2

## 2023-09-12 DIAGNOSIS — F98.8 ATTENTION DEFICIT DISORDER (ADD) WITHOUT HYPERACTIVITY: ICD-10-CM

## 2023-09-12 DIAGNOSIS — I10 ESSENTIAL HYPERTENSION: ICD-10-CM

## 2023-09-12 PROCEDURE — 3074F SYST BP LT 130 MM HG: CPT | Mod: CPTII,S$GLB,, | Performed by: FAMILY MEDICINE

## 2023-09-12 PROCEDURE — 4010F ACE/ARB THERAPY RXD/TAKEN: CPT | Mod: CPTII,S$GLB,, | Performed by: FAMILY MEDICINE

## 2023-09-12 PROCEDURE — 99214 OFFICE O/P EST MOD 30 MIN: CPT | Mod: S$GLB,,, | Performed by: FAMILY MEDICINE

## 2023-09-12 PROCEDURE — 3008F PR BODY MASS INDEX (BMI) DOCUMENTED: ICD-10-PCS | Mod: CPTII,S$GLB,, | Performed by: FAMILY MEDICINE

## 2023-09-12 PROCEDURE — 1159F PR MEDICATION LIST DOCUMENTED IN MEDICAL RECORD: ICD-10-PCS | Mod: CPTII,S$GLB,, | Performed by: FAMILY MEDICINE

## 2023-09-12 PROCEDURE — 3074F PR MOST RECENT SYSTOLIC BLOOD PRESSURE < 130 MM HG: ICD-10-PCS | Mod: CPTII,S$GLB,, | Performed by: FAMILY MEDICINE

## 2023-09-12 PROCEDURE — 4010F PR ACE/ARB THEARPY RXD/TAKEN: ICD-10-PCS | Mod: CPTII,S$GLB,, | Performed by: FAMILY MEDICINE

## 2023-09-12 PROCEDURE — 3079F DIAST BP 80-89 MM HG: CPT | Mod: CPTII,S$GLB,, | Performed by: FAMILY MEDICINE

## 2023-09-12 PROCEDURE — 3008F BODY MASS INDEX DOCD: CPT | Mod: CPTII,S$GLB,, | Performed by: FAMILY MEDICINE

## 2023-09-12 PROCEDURE — 3079F PR MOST RECENT DIASTOLIC BLOOD PRESSURE 80-89 MM HG: ICD-10-PCS | Mod: CPTII,S$GLB,, | Performed by: FAMILY MEDICINE

## 2023-09-12 PROCEDURE — 1159F MED LIST DOCD IN RCRD: CPT | Mod: CPTII,S$GLB,, | Performed by: FAMILY MEDICINE

## 2023-09-12 PROCEDURE — 99214 PR OFFICE/OUTPT VISIT, EST, LEVL IV, 30-39 MIN: ICD-10-PCS | Mod: S$GLB,,, | Performed by: FAMILY MEDICINE

## 2023-09-12 RX ORDER — LISINOPRIL 20 MG/1
20 TABLET ORAL DAILY
Qty: 90 TABLET | Refills: 3 | Status: SHIPPED | OUTPATIENT
Start: 2023-09-12 | End: 2024-09-11

## 2023-09-12 RX ORDER — LISDEXAMFETAMINE DIMESYLATE 40 MG/1
40 CAPSULE ORAL DAILY
Qty: 90 CAPSULE | Refills: 0 | Status: SHIPPED | OUTPATIENT
Start: 2023-09-12 | End: 2023-12-12 | Stop reason: SDUPTHER

## 2023-09-12 NOTE — PROGRESS NOTES
"  SUBJECTIVE:    Patient ID: Lucas Figueroa is a 47 y.o. male.    Chief Complaint: ADHD (Pt declined Influenza vaccine)  46 yo male here today to follow up on his ADD. Patient doing well on the Vyvanse patient states that his work is improved is improved his attitude home, he denies any chest pain shortness of breath weight loss dry mouth or headaches.       SPMHX:  Allergic Rhinitis: Zyrtec 10mg  ADD: Vyvanse 40mg  HTN: Started on Lisinopril/HCTZ 10/12.5 controlled.     Specialists:  Ortho: Dr Salamanca  Hand:  Dr Paulino     Smoke: None  ETOH: 5 a week  Exercise: Crossfit 4 days a week     HPI    Hypertension  This is a chronic problem. The current episode started more than 1 year ago. The problem is unchanged. The problem is controlled. Pertinent negatives include no anxiety, blurred vision, chest pain, headaches, malaise/fatigue, neck pain, orthopnea, palpitations, peripheral edema, PND, shortness of breath or sweats. There are no associated agents to hypertension. There are no known risk factors for coronary artery disease. Past treatments include ACE inhibitors and diuretics. The current treatment provides significant improvement.      ADHD Adult: On medications  Have difficulty sustaining attention in tasks or fun activities?  No  Don't follow through on instructions and fail to finish work?  No  Have difficulty organizing tasks and activities?  No  Avoid, dislike, or are reluctant to engage in work thar requires sustained mental effort? No  Easily distracted?  yes  Forgetful in daily activities?  yes   Fidget with hands or feet, or squirm in seat?  no  Have difficulty engaging in leisure activities or doing fun things quietly?  Yes  Feel "on the go" or "driven by a motor"?  no  Blurt out answers before questions have been completed?  no  Have difficulty waiting your turn, are impatient?  no  Interrupt or intrude on others?  no     Past Medical History:   Diagnosis Date    ADHD (attention deficit hyperactivity " "disorder)     Hypertension      Social History     Socioeconomic History    Marital status:    Tobacco Use    Smoking status: Never    Smokeless tobacco: Never   Substance and Sexual Activity    Alcohol use: Yes     Comment: occ    Drug use: Never    Sexual activity: Yes     Partners: Female     Social Determinants of Health     Stress: No Stress Concern Present (9/9/2020)    [facility] of Occupational Health - Occupational Stress Questionnaire     Feeling of Stress : Only a little     Past Surgical History:   Procedure Laterality Date    HERNIA REPAIR       Family History   Problem Relation Age of Onset    No Known Problems Mother     Melanoma Father      Current Outpatient Medications   Medication Sig Dispense Refill    lisdexamfetamine (VYVANSE) 40 MG Cap Take 1 capsule (40 mg total) by mouth once daily. 90 capsule 0    lisinopriL (PRINIVIL,ZESTRIL) 20 MG tablet Take 1 tablet (20 mg total) by mouth once daily. 90 tablet 3     No current facility-administered medications for this visit.     Review of patient's allergies indicates:  No Known Allergies    Review of Systems   Constitutional:  Negative for activity change, appetite change, diaphoresis, fatigue and unexpected weight change.   HENT:  Negative for congestion, rhinorrhea, sinus pressure and sinus pain.    Respiratory:  Negative for cough, chest tightness, shortness of breath and wheezing.    Cardiovascular:  Negative for chest pain and palpitations.   Genitourinary:  Negative for dysuria, flank pain, frequency and urgency.   Musculoskeletal:  Negative for neck pain.   Neurological:  Negative for headaches.          Blood pressure 118/86, pulse 86, height 6' 2" (1.88 m), weight 110.1 kg (242 lb 12.8 oz), SpO2 100 %. Body mass index is 31.17 kg/m².   Objective:      Physical Exam  Vitals reviewed.   Constitutional:       General: He is not in acute distress.     Appearance: Normal appearance. He is not ill-appearing or toxic-appearing.   HENT: "      Head: Normocephalic and atraumatic.      Right Ear: Tympanic membrane normal.      Left Ear: Tympanic membrane normal.   Cardiovascular:      Rate and Rhythm: Normal rate and regular rhythm.      Heart sounds: Normal heart sounds. No murmur heard.  Pulmonary:      Effort: Pulmonary effort is normal. No respiratory distress.      Breath sounds: Normal breath sounds. No wheezing or rhonchi.   Skin:     General: Skin is warm and dry.      Capillary Refill: Capillary refill takes less than 2 seconds.   Neurological:      Mental Status: He is alert and oriented to person, place, and time.             Assessment:       1. Essential hypertension    2. Attention deficit disorder (ADD) without hyperactivity         Plan:           Essential hypertension  -     lisinopriL (PRINIVIL,ZESTRIL) 20 MG tablet; Take 1 tablet (20 mg total) by mouth once daily.  Dispense: 90 tablet; Refill: 3    Attention deficit disorder (ADD) without hyperactivity  -     lisdexamfetamine (VYVANSE) 40 MG Cap; Take 1 capsule (40 mg total) by mouth once daily.  Dispense: 90 capsule; Refill: 0

## 2023-12-12 ENCOUNTER — OFFICE VISIT (OUTPATIENT)
Dept: FAMILY MEDICINE | Facility: CLINIC | Age: 48
End: 2023-12-12
Payer: COMMERCIAL

## 2023-12-12 VITALS
WEIGHT: 245.81 LBS | SYSTOLIC BLOOD PRESSURE: 124 MMHG | HEART RATE: 89 BPM | BODY MASS INDEX: 31.55 KG/M2 | DIASTOLIC BLOOD PRESSURE: 88 MMHG | HEIGHT: 74 IN | OXYGEN SATURATION: 98 %

## 2023-12-12 DIAGNOSIS — F98.8 ATTENTION DEFICIT DISORDER (ADD) WITHOUT HYPERACTIVITY: ICD-10-CM

## 2023-12-12 DIAGNOSIS — I10 ESSENTIAL HYPERTENSION: Primary | ICD-10-CM

## 2023-12-12 PROCEDURE — 4010F ACE/ARB THERAPY RXD/TAKEN: CPT | Mod: CPTII,S$GLB,, | Performed by: FAMILY MEDICINE

## 2023-12-12 PROCEDURE — 1159F PR MEDICATION LIST DOCUMENTED IN MEDICAL RECORD: ICD-10-PCS | Mod: CPTII,S$GLB,, | Performed by: FAMILY MEDICINE

## 2023-12-12 PROCEDURE — 3074F PR MOST RECENT SYSTOLIC BLOOD PRESSURE < 130 MM HG: ICD-10-PCS | Mod: CPTII,S$GLB,, | Performed by: FAMILY MEDICINE

## 2023-12-12 PROCEDURE — 3008F BODY MASS INDEX DOCD: CPT | Mod: CPTII,S$GLB,, | Performed by: FAMILY MEDICINE

## 2023-12-12 PROCEDURE — 4010F PR ACE/ARB THEARPY RXD/TAKEN: ICD-10-PCS | Mod: CPTII,S$GLB,, | Performed by: FAMILY MEDICINE

## 2023-12-12 PROCEDURE — 99214 OFFICE O/P EST MOD 30 MIN: CPT | Mod: S$GLB,,, | Performed by: FAMILY MEDICINE

## 2023-12-12 PROCEDURE — 99214 PR OFFICE/OUTPT VISIT, EST, LEVL IV, 30-39 MIN: ICD-10-PCS | Mod: S$GLB,,, | Performed by: FAMILY MEDICINE

## 2023-12-12 PROCEDURE — 3008F PR BODY MASS INDEX (BMI) DOCUMENTED: ICD-10-PCS | Mod: CPTII,S$GLB,, | Performed by: FAMILY MEDICINE

## 2023-12-12 PROCEDURE — 1159F MED LIST DOCD IN RCRD: CPT | Mod: CPTII,S$GLB,, | Performed by: FAMILY MEDICINE

## 2023-12-12 PROCEDURE — 3079F PR MOST RECENT DIASTOLIC BLOOD PRESSURE 80-89 MM HG: ICD-10-PCS | Mod: CPTII,S$GLB,, | Performed by: FAMILY MEDICINE

## 2023-12-12 PROCEDURE — 3079F DIAST BP 80-89 MM HG: CPT | Mod: CPTII,S$GLB,, | Performed by: FAMILY MEDICINE

## 2023-12-12 PROCEDURE — 3074F SYST BP LT 130 MM HG: CPT | Mod: CPTII,S$GLB,, | Performed by: FAMILY MEDICINE

## 2023-12-12 RX ORDER — LISDEXAMFETAMINE DIMESYLATE 40 MG/1
40 CAPSULE ORAL DAILY
Qty: 90 CAPSULE | Refills: 0 | Status: SHIPPED | OUTPATIENT
Start: 2023-12-12 | End: 2024-03-12 | Stop reason: SDUPTHER

## 2023-12-12 NOTE — PROGRESS NOTES
"  SUBJECTIVE:    Patient ID: Lucas Figueroa is a 48 y.o. male.    Chief Complaint: ADHD  47 yo male here today to follow up on his ADD. Patient doing well on the Vyvanse patient states that his work is improved is improved his attitude home, he denies any chest pain shortness of breath weight loss dry mouth or headaches.       SPMHX:  Allergic Rhinitis: Zyrtec 10mg  ADD: Vyvanse 40mg  HTN: Started on Lisinopril/HCTZ 10/12.5 controlled.     Specialists:  Ortho: Dr Salamanca  Hand:  Dr Paulino     Smoke: None  ETOH: 5 a week  Exercise: Crossfit 4 days a week    HPI    Hypertension  This is a chronic problem. The current episode started more than 1 year ago. The problem is unchanged. The problem is controlled. Pertinent negatives include no anxiety, blurred vision, chest pain, headaches, malaise/fatigue, neck pain, orthopnea, palpitations, peripheral edema, PND, shortness of breath or sweats. There are no associated agents to hypertension. There are no known risk factors for coronary artery disease. Past treatments include ACE inhibitors and diuretics. The current treatment provides significant improvement.      ADHD Adult: On medications  Have difficulty sustaining attention in tasks or fun activities?  No  Don't follow through on instructions and fail to finish work?  No  Have difficulty organizing tasks and activities?  No  Avoid, dislike, or are reluctant to engage in work thar requires sustained mental effort? No  Easily distracted?  yes  Forgetful in daily activities?  yes   Fidget with hands or feet, or squirm in seat?  no  Have difficulty engaging in leisure activities or doing fun things quietly?  Yes  Feel "on the go" or "driven by a motor"?  no  Blurt out answers before questions have been completed?  no  Have difficulty waiting your turn, are impatient?  no  Interrupt or intrude on others?  no      Past Medical History:   Diagnosis Date    ADHD (attention deficit hyperactivity disorder)     Hypertension  " "    Social History     Socioeconomic History    Marital status:    Tobacco Use    Smoking status: Never    Smokeless tobacco: Never   Substance and Sexual Activity    Alcohol use: Yes     Comment: occ    Drug use: Never    Sexual activity: Yes     Partners: Female     Social Determinants of Health     Stress: No Stress Concern Present (9/9/2020)    [facility] [place] of Occupational Health - Occupational Stress Questionnaire     Feeling of Stress : Only a little     Past Surgical History:   Procedure Laterality Date    HERNIA REPAIR       Family History   Problem Relation Age of Onset    No Known Problems Mother     Melanoma Father      Current Outpatient Medications   Medication Sig Dispense Refill    lisinopriL (PRINIVIL,ZESTRIL) 20 MG tablet Take 1 tablet (20 mg total) by mouth once daily. 90 tablet 3    lisdexamfetamine (VYVANSE) 40 MG Cap Take 1 capsule (40 mg total) by mouth once daily. 90 capsule 0     No current facility-administered medications for this visit.     Review of patient's allergies indicates:  No Known Allergies    Review of Systems   Constitutional:  Negative for activity change, appetite change, diaphoresis, fatigue and unexpected weight change.   HENT:  Negative for congestion, rhinorrhea, sinus pressure and sinus pain.    Respiratory:  Negative for cough, chest tightness, shortness of breath and wheezing.    Cardiovascular:  Negative for chest pain and palpitations.   Genitourinary:  Negative for dysuria, flank pain, frequency and urgency.   Musculoskeletal:  Negative for neck pain.   Neurological:  Negative for headaches.          Blood pressure 124/88, pulse 89, height 6' 2" (1.88 m), weight 111.5 kg (245 lb 12.8 oz), SpO2 98 %. Body mass index is 31.56 kg/m².   Objective:      Physical Exam  Vitals reviewed.   Constitutional:       General: He is not in acute distress.     Appearance: Normal appearance. He is not ill-appearing or toxic-appearing.   HENT:      Head: Normocephalic and " atraumatic.      Right Ear: Tympanic membrane normal.      Left Ear: Tympanic membrane normal.   Cardiovascular:      Rate and Rhythm: Normal rate and regular rhythm.      Heart sounds: Normal heart sounds. No murmur heard.  Pulmonary:      Effort: Pulmonary effort is normal. No respiratory distress.      Breath sounds: Normal breath sounds. No wheezing or rhonchi.   Skin:     General: Skin is warm and dry.      Capillary Refill: Capillary refill takes less than 2 seconds.   Neurological:      Mental Status: He is alert and oriented to person, place, and time.             Assessment:       1. Essential hypertension    2. Attention deficit disorder (ADD) without hyperactivity         Plan:           Essential hypertension  -     Lipid Panel; Future; Expected date: 12/12/2023  -     Comprehensive Metabolic Panel; Future; Expected date: 12/12/2023    Attention deficit disorder (ADD) without hyperactivity  -     lisdexamfetamine (VYVANSE) 40 MG Cap; Take 1 capsule (40 mg total) by mouth once daily.  Dispense: 90 capsule; Refill: 0

## 2023-12-13 RX ORDER — NIRMATRELVIR AND RITONAVIR 300-100 MG
KIT ORAL
Qty: 30 TABLET | Refills: 0 | Status: SHIPPED | OUTPATIENT
Start: 2023-12-13 | End: 2023-12-18

## 2024-03-04 ENCOUNTER — TELEPHONE (OUTPATIENT)
Dept: FAMILY MEDICINE | Facility: CLINIC | Age: 49
End: 2024-03-04
Payer: COMMERCIAL

## 2024-03-09 ENCOUNTER — LAB VISIT (OUTPATIENT)
Dept: LAB | Facility: HOSPITAL | Age: 49
End: 2024-03-09
Attending: FAMILY MEDICINE
Payer: COMMERCIAL

## 2024-03-09 DIAGNOSIS — I10 ESSENTIAL HYPERTENSION: ICD-10-CM

## 2024-03-09 LAB
ALBUMIN SERPL BCP-MCNC: 4.5 G/DL (ref 3.5–5.2)
ALP SERPL-CCNC: 70 U/L (ref 55–135)
ALT SERPL W/O P-5'-P-CCNC: 29 U/L (ref 10–44)
ANION GAP SERPL CALC-SCNC: 9 MMOL/L (ref 8–16)
AST SERPL-CCNC: 18 U/L (ref 10–40)
BILIRUB SERPL-MCNC: 0.8 MG/DL (ref 0.1–1)
BUN SERPL-MCNC: 17 MG/DL (ref 6–20)
CALCIUM SERPL-MCNC: 9.7 MG/DL (ref 8.7–10.5)
CHLORIDE SERPL-SCNC: 104 MMOL/L (ref 95–110)
CHOLEST SERPL-MCNC: 194 MG/DL (ref 120–199)
CHOLEST/HDLC SERPL: 3.5 {RATIO} (ref 2–5)
CO2 SERPL-SCNC: 27 MMOL/L (ref 23–29)
CREAT SERPL-MCNC: 1.1 MG/DL (ref 0.5–1.4)
EST. GFR  (NO RACE VARIABLE): >60 ML/MIN/1.73 M^2
GLUCOSE SERPL-MCNC: 98 MG/DL (ref 70–110)
HDLC SERPL-MCNC: 55 MG/DL (ref 40–75)
HDLC SERPL: 28.4 % (ref 20–50)
LDLC SERPL CALC-MCNC: 105.4 MG/DL (ref 63–159)
NONHDLC SERPL-MCNC: 139 MG/DL
POTASSIUM SERPL-SCNC: 4.3 MMOL/L (ref 3.5–5.1)
PROT SERPL-MCNC: 7.4 G/DL (ref 6–8.4)
SODIUM SERPL-SCNC: 140 MMOL/L (ref 136–145)
TRIGL SERPL-MCNC: 168 MG/DL (ref 30–150)

## 2024-03-09 PROCEDURE — 80053 COMPREHEN METABOLIC PANEL: CPT | Performed by: FAMILY MEDICINE

## 2024-03-09 PROCEDURE — 80061 LIPID PANEL: CPT | Performed by: FAMILY MEDICINE

## 2024-03-09 PROCEDURE — 36415 COLL VENOUS BLD VENIPUNCTURE: CPT | Performed by: FAMILY MEDICINE

## 2024-03-12 ENCOUNTER — OFFICE VISIT (OUTPATIENT)
Dept: FAMILY MEDICINE | Facility: CLINIC | Age: 49
End: 2024-03-12
Payer: COMMERCIAL

## 2024-03-12 VITALS
WEIGHT: 244.31 LBS | HEIGHT: 74 IN | DIASTOLIC BLOOD PRESSURE: 89 MMHG | SYSTOLIC BLOOD PRESSURE: 126 MMHG | OXYGEN SATURATION: 95 % | BODY MASS INDEX: 31.35 KG/M2 | HEART RATE: 80 BPM

## 2024-03-12 DIAGNOSIS — F98.8 ATTENTION DEFICIT DISORDER (ADD) WITHOUT HYPERACTIVITY: ICD-10-CM

## 2024-03-12 PROCEDURE — 1159F MED LIST DOCD IN RCRD: CPT | Mod: CPTII,S$GLB,, | Performed by: FAMILY MEDICINE

## 2024-03-12 PROCEDURE — 99999 PR PBB SHADOW E&M-EST. PATIENT-LVL IV: CPT | Mod: PBBFAC,,, | Performed by: FAMILY MEDICINE

## 2024-03-12 PROCEDURE — 3079F DIAST BP 80-89 MM HG: CPT | Mod: CPTII,S$GLB,, | Performed by: FAMILY MEDICINE

## 2024-03-12 PROCEDURE — 3008F BODY MASS INDEX DOCD: CPT | Mod: CPTII,S$GLB,, | Performed by: FAMILY MEDICINE

## 2024-03-12 PROCEDURE — 99213 OFFICE O/P EST LOW 20 MIN: CPT | Mod: S$GLB,,, | Performed by: FAMILY MEDICINE

## 2024-03-12 PROCEDURE — 3074F SYST BP LT 130 MM HG: CPT | Mod: CPTII,S$GLB,, | Performed by: FAMILY MEDICINE

## 2024-03-12 RX ORDER — LISDEXAMFETAMINE DIMESYLATE 40 MG/1
40 CAPSULE ORAL DAILY
Qty: 90 CAPSULE | Refills: 0 | Status: SHIPPED | OUTPATIENT
Start: 2024-03-12 | End: 2024-03-12 | Stop reason: SDUPTHER

## 2024-03-12 RX ORDER — LISDEXAMFETAMINE DIMESYLATE 40 MG/1
40 CAPSULE ORAL DAILY
Qty: 90 CAPSULE | Refills: 0 | Status: SHIPPED | OUTPATIENT
Start: 2024-03-12 | End: 2024-04-22 | Stop reason: ALTCHOICE

## 2024-03-12 NOTE — PROGRESS NOTES
"  SUBJECTIVE:    Patient ID: Lucas Figueroa is a 48 y.o. male.    Chief Complaint: ADHD  47 yo male here today to follow up on his ADD. Patient doing well on the Vyvanse patient states that his work is improved is improved his attitude home, he denies any chest pain shortness of breath weight loss dry mouth or headaches.       SPMHX:  Allergic Rhinitis: Zyrtec 10mg  ADD: Vyvanse 40mg  HTN: Started on Lisinopril/HCTZ 10/12.5 controlled.     Specialists:  Ortho: Dr Salamanca  Hand:  Dr Paulino     Smoke: None  ETOH: 5 a week  Exercise: Crossfit 4 days a week    Lipids  Cholesterol: 194   Triglycerides:  168   HDL: 55   LDL: 105    Fasting glucose:  98  Creatinine:  1 of 1  GFR:  60    HPI     ADHD Adult: On medications  Have difficulty sustaining attention in tasks or fun activities?  No  Don't follow through on instructions and fail to finish work?  No  Have difficulty organizing tasks and activities?  No  Avoid, dislike, or are reluctant to engage in work thar requires sustained mental effort? No  Easily distracted?  yes  Forgetful in daily activities?  yes   Fidget with hands or feet, or squirm in seat?  no  Have difficulty engaging in leisure activities or doing fun things quietly?  Yes  Feel "on the go" or "driven by a motor"?  no  Blurt out answers before questions have been completed?  no  Have difficulty waiting your turn, are impatient?  no  Interrupt or intrude on others?  no    Past Medical History:   Diagnosis Date    ADHD (attention deficit hyperactivity disorder)     Hypertension      Social History     Socioeconomic History    Marital status:    Tobacco Use    Smoking status: Never    Smokeless tobacco: Never   Substance and Sexual Activity    Alcohol use: Yes     Comment: occ    Drug use: Never    Sexual activity: Yes     Partners: Female     Social Determinants of Health     Stress: No Stress Concern Present (9/9/2020)    [place] of Occupational Health - Occupational Stress " "Questionnaire     Feeling of Stress : Only a little     Past Surgical History:   Procedure Laterality Date    HERNIA REPAIR       Family History   Problem Relation Age of Onset    No Known Problems Mother     Melanoma Father      Current Outpatient Medications   Medication Sig Dispense Refill    lisinopriL (PRINIVIL,ZESTRIL) 20 MG tablet Take 1 tablet (20 mg total) by mouth once daily. 90 tablet 3    lisdexamfetamine (VYVANSE) 40 MG Cap Take 1 capsule (40 mg total) by mouth once daily. 90 capsule 0     No current facility-administered medications for this visit.     Review of patient's allergies indicates:  No Known Allergies    Review of Systems   Constitutional:  Negative for activity change, appetite change, diaphoresis, fatigue and unexpected weight change.   HENT:  Negative for congestion, rhinorrhea, sinus pressure and sinus pain.    Respiratory:  Negative for cough, chest tightness, shortness of breath and wheezing.    Cardiovascular:  Negative for chest pain and palpitations.   Genitourinary:  Negative for dysuria, flank pain, frequency and urgency.   Musculoskeletal:  Negative for neck pain.   Neurological:  Negative for headaches.          Blood pressure 126/89, pulse 80, height 6' 2" (1.88 m), weight 110.8 kg (244 lb 4.8 oz), SpO2 95 %. Body mass index is 31.37 kg/m².   Objective:      Physical Exam  Vitals reviewed.   Constitutional:       General: He is not in acute distress.     Appearance: Normal appearance. He is not ill-appearing or toxic-appearing.   HENT:      Head: Normocephalic and atraumatic.      Right Ear: Tympanic membrane normal.      Left Ear: Tympanic membrane normal.      Nose: Nose normal. No congestion or rhinorrhea.   Cardiovascular:      Rate and Rhythm: Normal rate and regular rhythm.      Heart sounds: Normal heart sounds. No murmur heard.  Pulmonary:      Effort: Pulmonary effort is normal. No respiratory distress.      Breath sounds: Normal breath sounds. No wheezing.   Skin:     " General: Skin is warm and dry.      Capillary Refill: Capillary refill takes less than 2 seconds.   Neurological:      Mental Status: He is alert and oriented to person, place, and time.             Assessment:       1. Attention deficit disorder (ADD) without hyperactivity         Plan:           Attention deficit disorder (ADD) without hyperactivity  -     Discontinue: lisdexamfetamine (VYVANSE) 40 MG Cap; Take 1 capsule (40 mg total) by mouth once daily.  Dispense: 90 capsule; Refill: 0  -     lisdexamfetamine (VYVANSE) 40 MG Cap; Take 1 capsule (40 mg total) by mouth once daily.  Dispense: 90 capsule; Refill: 0    Pt denies any chest pain, palpitation, head aches, No weight loss. No depression or anxiety. No chest pain or palpitations. Denies dry mouth. Will continue on her current dose of medications.

## 2024-04-17 ENCOUNTER — PATIENT MESSAGE (OUTPATIENT)
Dept: FAMILY MEDICINE | Facility: CLINIC | Age: 49
End: 2024-04-17
Payer: COMMERCIAL

## 2024-04-22 DIAGNOSIS — F98.8 ATTENTION DEFICIT DISORDER (ADD) WITHOUT HYPERACTIVITY: Primary | ICD-10-CM

## 2024-04-22 RX ORDER — LISDEXAMFETAMINE DIMESYLATE 50 MG/1
50 CAPSULE ORAL EVERY MORNING
Qty: 90 CAPSULE | Refills: 0 | Status: SHIPPED | OUTPATIENT
Start: 2024-04-22 | End: 2024-06-11 | Stop reason: SDUPTHER

## 2024-06-11 ENCOUNTER — OFFICE VISIT (OUTPATIENT)
Dept: FAMILY MEDICINE | Facility: CLINIC | Age: 49
End: 2024-06-11
Payer: COMMERCIAL

## 2024-06-11 VITALS
SYSTOLIC BLOOD PRESSURE: 117 MMHG | HEIGHT: 74 IN | BODY MASS INDEX: 31.83 KG/M2 | OXYGEN SATURATION: 96 % | HEART RATE: 88 BPM | WEIGHT: 248 LBS | DIASTOLIC BLOOD PRESSURE: 78 MMHG

## 2024-06-11 DIAGNOSIS — F98.8 ATTENTION DEFICIT DISORDER (ADD) WITHOUT HYPERACTIVITY: ICD-10-CM

## 2024-06-11 DIAGNOSIS — I10 ESSENTIAL HYPERTENSION: ICD-10-CM

## 2024-06-11 PROCEDURE — 3008F BODY MASS INDEX DOCD: CPT | Mod: CPTII,S$GLB,, | Performed by: FAMILY MEDICINE

## 2024-06-11 PROCEDURE — 99213 OFFICE O/P EST LOW 20 MIN: CPT | Mod: S$GLB,,, | Performed by: FAMILY MEDICINE

## 2024-06-11 PROCEDURE — 4010F ACE/ARB THERAPY RXD/TAKEN: CPT | Mod: CPTII,S$GLB,, | Performed by: FAMILY MEDICINE

## 2024-06-11 PROCEDURE — 99999 PR PBB SHADOW E&M-EST. PATIENT-LVL III: CPT | Mod: PBBFAC,,, | Performed by: FAMILY MEDICINE

## 2024-06-11 PROCEDURE — 1159F MED LIST DOCD IN RCRD: CPT | Mod: CPTII,S$GLB,, | Performed by: FAMILY MEDICINE

## 2024-06-11 PROCEDURE — 3074F SYST BP LT 130 MM HG: CPT | Mod: CPTII,S$GLB,, | Performed by: FAMILY MEDICINE

## 2024-06-11 PROCEDURE — 3078F DIAST BP <80 MM HG: CPT | Mod: CPTII,S$GLB,, | Performed by: FAMILY MEDICINE

## 2024-06-11 RX ORDER — LISINOPRIL 20 MG/1
20 TABLET ORAL DAILY
Qty: 90 TABLET | Refills: 3 | Status: SHIPPED | OUTPATIENT
Start: 2024-06-11 | End: 2025-06-11

## 2024-06-11 RX ORDER — LISDEXAMFETAMINE DIMESYLATE 50 MG/1
50 CAPSULE ORAL EVERY MORNING
Qty: 90 CAPSULE | Refills: 0 | Status: SHIPPED | OUTPATIENT
Start: 2024-06-11

## 2024-06-11 NOTE — PROGRESS NOTES
"  SUBJECTIVE:    Patient ID: Lucas Figueroa is a 48 y.o. male.    Chief Complaint: ADD  47 yo male here today to follow up on his ADD. Patient doing well on the Vyvanse patient states that his work is improved is improved his attitude home, he denies any chest pain shortness of breath weight loss dry mouth or headaches.       SPMHX:  Allergic Rhinitis: Zyrtec 10mg  ADD: Vyvanse 40mg  HTN: Started on Lisinopril/HCTZ 10/12.5 controlled.     Specialists:  Ortho: Dr Salamanca  Hand:  Dr Paulino     Smoke: None  ETOH: 5 a week  Exercise: Crossfit 4 days a week       Hypertension  This is a chronic problem. The current episode started more than 1 year ago. The problem is unchanged. The problem is controlled. Pertinent negatives include no anxiety, blurred vision, chest pain, headaches, malaise/fatigue, neck pain, orthopnea, palpitations, peripheral edema, PND, shortness of breath or sweats. There are no associated agents to hypertension. Risk factors for coronary artery disease include male gender. Past treatments include ACE inhibitors. The current treatment provides moderate improvement.       ADHD Adult: On medications  Have difficulty sustaining attention in tasks or fun activities?  No  Don't follow through on instructions and fail to finish work?  No  Have difficulty organizing tasks and activities?  No  Avoid, dislike, or are reluctant to engage in work thar requires sustained mental effort? No  Easily distracted?  yes  Forgetful in daily activities?  yes   Fidget with hands or feet, or squirm in seat?  no  Have difficulty engaging in leisure activities or doing fun things quietly?  Yes  Feel "on the go" or "driven by a motor"?  no  Blurt out answers before questions have been completed?  no  Have difficulty waiting your turn, are impatient?  no  Interrupt or intrude on others?  no      Past Medical History:   Diagnosis Date    ADHD (attention deficit hyperactivity disorder)     Hypertension      Social History " "    Socioeconomic History    Marital status:    Tobacco Use    Smoking status: Never    Smokeless tobacco: Never   Substance and Sexual Activity    Alcohol use: Yes     Comment: occ    Drug use: Never    Sexual activity: Yes     Partners: Female     Social Determinants of Health     Stress: No Stress Concern Present (9/9/2020)    [facility] of Occupational Health - Occupational Stress Questionnaire     Feeling of Stress : Only a little     Past Surgical History:   Procedure Laterality Date    HERNIA REPAIR       Family History   Problem Relation Name Age of Onset    No Known Problems Mother      Melanoma Father       Current Outpatient Medications   Medication Sig Dispense Refill    lisdexamfetamine (VYVANSE) 50 MG capsule Take 1 capsule (50 mg total) by mouth every morning. 90 capsule 0    lisinopriL (PRINIVIL,ZESTRIL) 20 MG tablet Take 1 tablet (20 mg total) by mouth once daily. 90 tablet 3     No current facility-administered medications for this visit.     Review of patient's allergies indicates:  No Known Allergies    Review of Systems   Constitutional:  Negative for activity change, appetite change, diaphoresis, fatigue, malaise/fatigue and unexpected weight change.   HENT:  Negative for congestion, rhinorrhea, sinus pressure and sinus pain.    Eyes:  Negative for blurred vision.   Respiratory:  Negative for cough, chest tightness, shortness of breath and wheezing.    Cardiovascular:  Negative for chest pain, palpitations, orthopnea and PND.   Genitourinary:  Negative for dysuria, flank pain, frequency and urgency.   Musculoskeletal:  Negative for neck pain.   Neurological:  Negative for headaches.          Blood pressure 117/78, pulse 88, height 6' 2" (1.88 m), weight 112.5 kg (248 lb), SpO2 96%. Body mass index is 31.84 kg/m².   Objective:      Physical Exam  Vitals reviewed.   Constitutional:       General: He is not in acute distress.     Appearance: Normal appearance. He is not ill-appearing " or toxic-appearing.   HENT:      Head: Normocephalic and atraumatic.      Right Ear: Tympanic membrane normal.      Left Ear: Tympanic membrane normal.      Nose: Nose normal. No congestion or rhinorrhea.   Cardiovascular:      Rate and Rhythm: Normal rate and regular rhythm.      Heart sounds: Normal heart sounds. No murmur heard.  Pulmonary:      Effort: Pulmonary effort is normal. No respiratory distress.      Breath sounds: Normal breath sounds. No wheezing.   Skin:     General: Skin is warm and dry.      Capillary Refill: Capillary refill takes less than 2 seconds.   Neurological:      Mental Status: He is alert and oriented to person, place, and time.             Assessment:       1. Attention deficit disorder (ADD) without hyperactivity    2. Essential hypertension         Plan:           Attention deficit disorder (ADD) without hyperactivity  -     lisdexamfetamine (VYVANSE) 50 MG capsule; Take 1 capsule (50 mg total) by mouth every morning.  Dispense: 90 capsule; Refill: 0  ADHD medication refilled after  reviewed and no red flags noted. Patient reports improvement in symptoms with no significant side effects.     I explained the side effects of ADHD stimulants including emotional lability, seizures, arrhythmias, palpitations, hypertension, dizziness, syncope, appetite changes, weight loss, insomnia, addiction/dependency, elevated heart rate, headaches, anxiety/agitation, and worsening of underlying psychiatric conditions. Patient denies having history of cardiac disease. He denies chest pain upon exertion, dyspnea, nausea, vomiting, diaphoresis, and syncope. While ADHD medications do carry risk of arrhythmias, hypertension, neurological, and psychiatric complications, I feel that the benefits exceed the risks in this patient. A discussion was made going over the risks and benefits of the medication and after shared decision making they decided to continue their medication. They understood the choice,  were able to express a  choice, appreciated the risks associated with it, and they had logical reasoning for their choice and demonstrated capacity.  reviewed and consistent with medication use as prescribed. The patient was cautioned to go to the emergency room for chest pain, severe headache, fainting, etc.  Patient was told to report any side effects or any symptoms to me immediately and they agreed to this plan. All questions were answered.         Essential hypertension  -     lisinopriL (PRINIVIL,ZESTRIL) 20 MG tablet; Take 1 tablet (20 mg total) by mouth once daily.  Dispense: 90 tablet; Refill: 3  Reduce the amount of salt in your diet; Lose weight; Avoid drinking too much alcohol; Exercise at least 30 minutes per day most days of the week.  Continue current medications and home BP monitoring.

## 2024-09-09 ENCOUNTER — OFFICE VISIT (OUTPATIENT)
Dept: FAMILY MEDICINE | Facility: CLINIC | Age: 49
End: 2024-09-09
Payer: COMMERCIAL

## 2024-09-09 VITALS
OXYGEN SATURATION: 98 % | HEIGHT: 74 IN | HEART RATE: 70 BPM | WEIGHT: 241.88 LBS | SYSTOLIC BLOOD PRESSURE: 132 MMHG | BODY MASS INDEX: 31.04 KG/M2 | DIASTOLIC BLOOD PRESSURE: 92 MMHG

## 2024-09-09 DIAGNOSIS — F98.8 ATTENTION DEFICIT DISORDER (ADD) WITHOUT HYPERACTIVITY: ICD-10-CM

## 2024-09-09 PROCEDURE — 3080F DIAST BP >= 90 MM HG: CPT | Mod: CPTII,S$GLB,, | Performed by: FAMILY MEDICINE

## 2024-09-09 PROCEDURE — 4010F ACE/ARB THERAPY RXD/TAKEN: CPT | Mod: CPTII,S$GLB,, | Performed by: FAMILY MEDICINE

## 2024-09-09 PROCEDURE — 3075F SYST BP GE 130 - 139MM HG: CPT | Mod: CPTII,S$GLB,, | Performed by: FAMILY MEDICINE

## 2024-09-09 PROCEDURE — 3008F BODY MASS INDEX DOCD: CPT | Mod: CPTII,S$GLB,, | Performed by: FAMILY MEDICINE

## 2024-09-09 PROCEDURE — 99999 PR PBB SHADOW E&M-EST. PATIENT-LVL III: CPT | Mod: PBBFAC,,, | Performed by: FAMILY MEDICINE

## 2024-09-09 PROCEDURE — 99213 OFFICE O/P EST LOW 20 MIN: CPT | Mod: S$GLB,,, | Performed by: FAMILY MEDICINE

## 2024-09-09 RX ORDER — LISDEXAMFETAMINE DIMESYLATE 50 MG/1
50 CAPSULE ORAL EVERY MORNING
Qty: 90 CAPSULE | Refills: 0 | Status: SHIPPED | OUTPATIENT
Start: 2024-09-09

## 2024-09-09 NOTE — PROGRESS NOTES
"  SUBJECTIVE:    Patient ID: Lucas Figueora is a 48 y.o. male.    Chief Complaint: ADD  47 yo male here today to follow up on his ADD. Patient doing well on the Vyvanse patient states that his work is improved is improved his attitude home, he denies any chest pain shortness of breath weight loss dry mouth or headaches.       SPMHX:  Allergic Rhinitis: Zyrtec 10mg  ADD: Vyvanse 40mg  HTN: Started on Lisinopril/HCTZ 10/12.5 controlled.     Specialists:  Ortho: Dr Salamanca  Hand:  Dr Paulino     Smoke: None  ETOH: 5 a week  Exercise: Crossfit 4 days a week      HPI    ADHD Adult: On medications  Have difficulty sustaining attention in tasks or fun activities?  No  Don't follow through on instructions and fail to finish work?  No  Have difficulty organizing tasks and activities?  No  Avoid, dislike, or are reluctant to engage in work thar requires sustained mental effort? No  Easily distracted?  yes  Forgetful in daily activities?  yes   Fidget with hands or feet, or squirm in seat?  no  Have difficulty engaging in leisure activities or doing fun things quietly?  Yes  Feel "on the go" or "driven by a motor"?  no  Blurt out answers before questions have been completed?  no  Have difficulty waiting your turn, are impatient?  no  Interrupt or intrude on others?  no    Past Medical History:   Diagnosis Date    ADHD (attention deficit hyperactivity disorder)     Hypertension      Social History     Socioeconomic History    Marital status:    Tobacco Use    Smoking status: Never    Smokeless tobacco: Never   Substance and Sexual Activity    Alcohol use: Yes     Comment: occ    Drug use: Never    Sexual activity: Yes     Partners: Female     Social Determinants of Health     Stress: No Stress Concern Present (9/9/2020)    [employer] - Occupational Stress Questionnaire     Feeling of Stress : Only a little     Past Surgical History:   Procedure Laterality Date    HERNIA REPAIR       Family " "History   Problem Relation Name Age of Onset    No Known Problems Mother      Melanoma Father       Current Outpatient Medications   Medication Sig Dispense Refill    lisinopriL (PRINIVIL,ZESTRIL) 20 MG tablet Take 1 tablet (20 mg total) by mouth once daily. 90 tablet 3    lisdexamfetamine (VYVANSE) 50 MG capsule Take 1 capsule (50 mg total) by mouth every morning. 90 capsule 0     No current facility-administered medications for this visit.     Review of patient's allergies indicates:  No Known Allergies    Review of Systems   Constitutional:  Negative for activity change, appetite change, diaphoresis, fatigue and unexpected weight change.   HENT:  Negative for congestion, rhinorrhea, sinus pressure and sinus pain.    Respiratory:  Negative for cough, chest tightness, shortness of breath and wheezing.    Cardiovascular:  Negative for chest pain and palpitations.   Genitourinary:  Negative for dysuria, flank pain, frequency and urgency.   Musculoskeletal:  Negative for neck pain.   Neurological:  Negative for headaches.          Blood pressure (!) 132/92, pulse 70, height 6' 2" (1.88 m), weight 109.7 kg (241 lb 14.4 oz), SpO2 98%. Body mass index is 31.06 kg/m².   Objective:      Physical Exam  Vitals reviewed.   Constitutional:       General: He is not in acute distress.     Appearance: Normal appearance. He is not ill-appearing or toxic-appearing.   HENT:      Head: Normocephalic and atraumatic.      Right Ear: Tympanic membrane normal.      Left Ear: Tympanic membrane normal.      Nose: Nose normal. No congestion or rhinorrhea.   Cardiovascular:      Rate and Rhythm: Normal rate and regular rhythm.      Heart sounds: Normal heart sounds. No murmur heard.  Pulmonary:      Effort: Pulmonary effort is normal. No respiratory distress.      Breath sounds: Normal breath sounds. No wheezing.   Skin:     General: Skin is warm and dry.      Capillary Refill: Capillary refill takes less than 2 seconds.   Neurological:      " Mental Status: He is alert and oriented to person, place, and time.             Assessment:       1. Attention deficit disorder (ADD) without hyperactivity         Plan:           Attention deficit disorder (ADD) without hyperactivity  -     lisdexamfetamine (VYVANSE) 50 MG capsule; Take 1 capsule (50 mg total) by mouth every morning.  Dispense: 90 capsule; Refill: 0      Pt denies any chest pain, palpitation, head aches, No weight loss. No depression or anxiety. No chest pain or palpitations. Denies dry mouth. Will continue on her current dose of medications.

## 2024-12-10 ENCOUNTER — OFFICE VISIT (OUTPATIENT)
Dept: FAMILY MEDICINE | Facility: CLINIC | Age: 49
End: 2024-12-10
Payer: COMMERCIAL

## 2024-12-10 VITALS
HEART RATE: 103 BPM | DIASTOLIC BLOOD PRESSURE: 88 MMHG | OXYGEN SATURATION: 96 % | WEIGHT: 233.69 LBS | HEIGHT: 74 IN | SYSTOLIC BLOOD PRESSURE: 124 MMHG | BODY MASS INDEX: 29.99 KG/M2

## 2024-12-10 DIAGNOSIS — F98.8 ATTENTION DEFICIT DISORDER (ADD) WITHOUT HYPERACTIVITY: ICD-10-CM

## 2024-12-10 DIAGNOSIS — I10 ESSENTIAL HYPERTENSION: ICD-10-CM

## 2024-12-10 DIAGNOSIS — Z20.828 EXPOSURE TO INFLUENZA: Primary | ICD-10-CM

## 2024-12-10 PROCEDURE — 4010F ACE/ARB THERAPY RXD/TAKEN: CPT | Mod: CPTII,S$GLB,, | Performed by: FAMILY MEDICINE

## 2024-12-10 PROCEDURE — 3074F SYST BP LT 130 MM HG: CPT | Mod: CPTII,S$GLB,, | Performed by: FAMILY MEDICINE

## 2024-12-10 PROCEDURE — 1159F MED LIST DOCD IN RCRD: CPT | Mod: CPTII,S$GLB,, | Performed by: FAMILY MEDICINE

## 2024-12-10 PROCEDURE — 99999 PR PBB SHADOW E&M-EST. PATIENT-LVL III: CPT | Mod: PBBFAC,,, | Performed by: FAMILY MEDICINE

## 2024-12-10 PROCEDURE — 3008F BODY MASS INDEX DOCD: CPT | Mod: CPTII,S$GLB,, | Performed by: FAMILY MEDICINE

## 2024-12-10 PROCEDURE — 3079F DIAST BP 80-89 MM HG: CPT | Mod: CPTII,S$GLB,, | Performed by: FAMILY MEDICINE

## 2024-12-10 PROCEDURE — 99213 OFFICE O/P EST LOW 20 MIN: CPT | Mod: S$GLB,,, | Performed by: FAMILY MEDICINE

## 2024-12-10 RX ORDER — LISINOPRIL 20 MG/1
20 TABLET ORAL DAILY
Qty: 90 TABLET | Refills: 3 | Status: SHIPPED | OUTPATIENT
Start: 2024-12-10 | End: 2025-12-10

## 2024-12-10 RX ORDER — AZELASTINE 1 MG/ML
SPRAY, METERED NASAL
COMMUNITY
Start: 2024-12-09

## 2024-12-10 RX ORDER — BENZONATATE 200 MG/1
CAPSULE ORAL
COMMUNITY
Start: 2024-12-09

## 2024-12-10 RX ORDER — LISDEXAMFETAMINE DIMESYLATE 50 MG/1
50 CAPSULE ORAL EVERY MORNING
Qty: 90 CAPSULE | Refills: 0 | Status: SHIPPED | OUTPATIENT
Start: 2024-12-10

## 2024-12-10 RX ORDER — ALBUTEROL SULFATE 90 UG/1
INHALANT RESPIRATORY (INHALATION)
COMMUNITY
Start: 2024-12-09

## 2024-12-10 RX ORDER — OSELTAMIVIR PHOSPHATE 75 MG/1
CAPSULE ORAL
COMMUNITY
Start: 2024-12-09 | End: 2024-12-10 | Stop reason: SDUPTHER

## 2024-12-10 RX ORDER — PROMETHAZINE HYDROCHLORIDE AND DEXTROMETHORPHAN HYDROBROMIDE 6.25; 15 MG/5ML; MG/5ML
SYRUP ORAL
COMMUNITY
Start: 2024-12-09

## 2024-12-10 RX ORDER — OSELTAMIVIR PHOSPHATE 75 MG/1
75 CAPSULE ORAL 2 TIMES DAILY
Qty: 10 CAPSULE | Refills: 0 | Status: SHIPPED | OUTPATIENT
Start: 2024-12-10 | End: 2024-12-10 | Stop reason: SDUPTHER

## 2024-12-10 RX ORDER — OSELTAMIVIR PHOSPHATE 75 MG/1
75 CAPSULE ORAL 2 TIMES DAILY
Qty: 10 CAPSULE | Refills: 0 | Status: SHIPPED | OUTPATIENT
Start: 2024-12-10

## 2024-12-10 NOTE — PROGRESS NOTES
SUBJECTIVE:    Patient ID: Lucas Figueroa is a 49 y.o. male.    Chief Complaint: ADD  48 yo male here today to follow up on his ADD. Patient doing well on the Vyvanse. Pt denies any chest pain, palpitation, head aches, No weight loss. No depression or anxiety. No chest pain or palpitations. Denies dry mouth. Will continue on her current dose of medications.     Patient was recently exposed to influenza a both his wife and his son have tested positive patient is beginning to have symptoms he feels like he is getting better he is not complaining of any fevers body aches.  But but when tested yesterday was negative.  We discussed starting on prophylactic Tamiflu.    SPMHX:  Allergic Rhinitis: Zyrtec 10mg  ADD: Vyvanse 40mg  HTN: Started on Lisinopril/HCTZ 10/12.5 controlled.     Specialists:  Ortho: Dr Salamanca  Hand:  Dr Paulino     Smoke: None  ETOH: 5 a week  Exercise: Crossfit 4 days a week    Influenza  This is a new problem. The current episode started yesterday. Associated symptoms include congestion. Pertinent negatives include no abdominal pain, anorexia, arthralgias, change in bowel habit, chest pain, chills, coughing, diaphoresis, fatigue, fever, headaches, joint swelling, myalgias, nausea, neck pain, numbness, rash, sore throat, swollen glands, urinary symptoms, vertigo, visual change, vomiting or weakness. Nothing aggravates the symptoms. He has tried nothing for the symptoms. The treatment provided no relief.       ADHD Adult: On medications  Have difficulty sustaining attention in tasks or fun activities?  No  Don't follow through on instructions and fail to finish work?  No  Have difficulty organizing tasks and activities?  No  Avoid, dislike, or are reluctant to engage in work thar requires sustained mental effort? No  Easily distracted?  yes  Forgetful in daily activities?  yes   Fidget with hands or feet, or squirm in seat?  no  Have difficulty engaging in leisure activities or doing fun things  "quietly?  Yes  Feel "on the go" or "driven by a motor"?  no  Blurt out answers before questions have been completed?  no  Have difficulty waiting your turn, are impatient?  no  Interrupt or intrude on others?  no      Past Medical History:   Diagnosis Date    ADHD (attention deficit hyperactivity disorder)     Hypertension      Social History     Socioeconomic History    Marital status:    Tobacco Use    Smoking status: Never    Smokeless tobacco: Never   Substance and Sexual Activity    Alcohol use: Yes     Comment: occ    Drug use: Never    Sexual activity: Yes     Partners: Female     Social Drivers of Health     Stress: No Stress Concern Present (2020)    [facility] [employer] - Occupational Stress Questionnaire     Feeling of Stress : Only a little     Past Surgical History:   Procedure Laterality Date    HERNIA REPAIR       Family History   Problem Relation Name Age of Onset    No Known Problems Mother      Melanoma Father       Current Outpatient Medications   Medication Sig Dispense Refill    albuterol (PROVENTIL/VENTOLIN HFA) 90 mcg/actuation inhaler Inhale into the lungs.      azelastine (ASTELIN) 137 mcg (0.1 %) nasal spray SMARTSIG:Both Nares      benzonatate (TESSALON) 200 MG capsule SMARTSI.0 Capsule(s) By Mouth 3 Times Daily PRN      promethazine-dextromethorphan (PROMETHAZINE-DM) 6.25-15 mg/5 mL Syrp Take by mouth.      lisdexamfetamine (VYVANSE) 50 MG capsule Take 1 capsule (50 mg total) by mouth every morning. 90 capsule 0    lisinopriL (PRINIVIL,ZESTRIL) 20 MG tablet Take 1 tablet (20 mg total) by mouth once daily. 90 tablet 3    oseltamivir (TAMIFLU) 75 MG capsule Take 1 capsule (75 mg total) by mouth 2 (two) times daily. 10 capsule 0     No current facility-administered medications for this visit.     Review of patient's allergies indicates:  No Known Allergies    Review of Systems   Constitutional:  Negative for activity change, appetite change, chills, " "diaphoresis, fatigue, fever and unexpected weight change.   HENT:  Positive for congestion. Negative for rhinorrhea, sinus pressure, sinus pain and sore throat.    Respiratory:  Negative for cough, chest tightness, shortness of breath and wheezing.    Cardiovascular:  Negative for chest pain and palpitations.   Gastrointestinal:  Negative for abdominal pain, anorexia, change in bowel habit, nausea and vomiting.   Genitourinary:  Negative for dysuria, flank pain, frequency and urgency.   Musculoskeletal:  Negative for arthralgias, joint swelling, myalgias and neck pain.   Skin:  Negative for rash.   Neurological:  Negative for vertigo, weakness, numbness and headaches.          Blood pressure 124/88, pulse 103, height 6' 2" (1.88 m), weight 106 kg (233 lb 11.2 oz), SpO2 96%. Body mass index is 30.01 kg/m².   Objective:      Physical Exam  Vitals reviewed.   Constitutional:       General: He is not in acute distress.     Appearance: Normal appearance. He is not ill-appearing or toxic-appearing.   HENT:      Head: Normocephalic and atraumatic.      Right Ear: Tympanic membrane normal.      Left Ear: Tympanic membrane normal.      Nose: Rhinorrhea present. No congestion.   Cardiovascular:      Rate and Rhythm: Normal rate and regular rhythm.      Heart sounds: Normal heart sounds. No murmur heard.  Pulmonary:      Effort: Pulmonary effort is normal. No respiratory distress.      Breath sounds: Normal breath sounds. No wheezing.   Skin:     General: Skin is warm and dry.      Capillary Refill: Capillary refill takes less than 2 seconds.   Neurological:      Mental Status: He is alert and oriented to person, place, and time.         Assessment:       1. Exposure to influenza    2. Essential hypertension    3. Attention deficit disorder (ADD) without hyperactivity         Plan:           Exposure to influenza  -     oseltamivir (TAMIFLU) 75 MG capsule; Take 1 capsule (75 mg total) by mouth 2 (two) times daily.  Dispense: " 10 capsule; Refill: 0    Essential hypertension  -     lisinopriL (PRINIVIL,ZESTRIL) 20 MG tablet; Take 1 tablet (20 mg total) by mouth once daily.  Dispense: 90 tablet; Refill: 3    Attention deficit disorder (ADD) without hyperactivity  -     lisdexamfetamine (VYVANSE) 50 MG capsule; Take 1 capsule (50 mg total) by mouth every morning.  Dispense: 90 capsule; Refill: 0    Other orders  -     Discontinue: oseltamivir (TAMIFLU) 75 MG capsule; Take 1 capsule (75 mg total) by mouth 2 (two) times daily.  Dispense: 10 capsule; Refill: 0

## 2025-03-10 ENCOUNTER — OFFICE VISIT (OUTPATIENT)
Dept: FAMILY MEDICINE | Facility: CLINIC | Age: 50
End: 2025-03-10
Payer: COMMERCIAL

## 2025-03-10 VITALS
BODY MASS INDEX: 31.18 KG/M2 | SYSTOLIC BLOOD PRESSURE: 133 MMHG | DIASTOLIC BLOOD PRESSURE: 88 MMHG | HEIGHT: 74 IN | HEART RATE: 71 BPM | WEIGHT: 243 LBS | OXYGEN SATURATION: 99 %

## 2025-03-10 DIAGNOSIS — F51.01 PRIMARY INSOMNIA: ICD-10-CM

## 2025-03-10 DIAGNOSIS — F98.8 ATTENTION DEFICIT DISORDER (ADD) WITHOUT HYPERACTIVITY: ICD-10-CM

## 2025-03-10 DIAGNOSIS — I10 ESSENTIAL HYPERTENSION: Primary | ICD-10-CM

## 2025-03-10 PROCEDURE — 3075F SYST BP GE 130 - 139MM HG: CPT | Mod: CPTII,S$GLB,, | Performed by: FAMILY MEDICINE

## 2025-03-10 PROCEDURE — 3008F BODY MASS INDEX DOCD: CPT | Mod: CPTII,S$GLB,, | Performed by: FAMILY MEDICINE

## 2025-03-10 PROCEDURE — 3079F DIAST BP 80-89 MM HG: CPT | Mod: CPTII,S$GLB,, | Performed by: FAMILY MEDICINE

## 2025-03-10 PROCEDURE — 4010F ACE/ARB THERAPY RXD/TAKEN: CPT | Mod: CPTII,S$GLB,, | Performed by: FAMILY MEDICINE

## 2025-03-10 PROCEDURE — 99214 OFFICE O/P EST MOD 30 MIN: CPT | Mod: S$GLB,,, | Performed by: FAMILY MEDICINE

## 2025-03-10 PROCEDURE — 99999 PR PBB SHADOW E&M-EST. PATIENT-LVL III: CPT | Mod: PBBFAC,,, | Performed by: FAMILY MEDICINE

## 2025-03-10 PROCEDURE — 1159F MED LIST DOCD IN RCRD: CPT | Mod: CPTII,S$GLB,, | Performed by: FAMILY MEDICINE

## 2025-03-10 RX ORDER — DOXEPIN 6 MG/1
6 TABLET, FILM COATED ORAL NIGHTLY
Qty: 90 TABLET | Refills: 0 | Status: SHIPPED | OUTPATIENT
Start: 2025-03-10

## 2025-03-10 RX ORDER — LISDEXAMFETAMINE DIMESYLATE 50 MG/1
50 CAPSULE ORAL EVERY MORNING
Qty: 90 CAPSULE | Refills: 0 | Status: SHIPPED | OUTPATIENT
Start: 2025-03-10

## 2025-03-10 NOTE — PROGRESS NOTES
"  SUBJECTIVE:    Patient ID: Lucas Figueroa is a 49 y.o. male.    Chief Complaint: ADHD  48 yo male here today to follow up on his ADD. Patient doing well on the Vyvanse patient states that his work is improved is improved his attitude home, he denies any chest pain shortness of breath weight loss dry mouth or headaches.       SPMHX:  Allergic Rhinitis: Zyrtec 10mg  ADD: Vyvanse 40mg  HTN: Started on Lisinopril/HCTZ 10/12.5 controlled.     Specialists:  Ortho: Dr Salamanca  Hand:  Dr Paulino     Smoke: None  ETOH: 5 a week  Exercise: Crossfit 4 days a week     HPI    ADHD Adult: On medications  Have difficulty sustaining attention in tasks or fun activities?  No  Don't follow through on instructions and fail to finish work?  No  Have difficulty organizing tasks and activities?  No  Avoid, dislike, or are reluctant to engage in work thar requires sustained mental effort? No  Easily distracted?  yes  Forgetful in daily activities?  yes   Fidget with hands or feet, or squirm in seat?  no  Have difficulty engaging in leisure activities or doing fun things quietly?  Yes  Feel "on the go" or "driven by a motor"?  no  Blurt out answers before questions have been completed?  no  Have difficulty waiting your turn, are impatient?  no  Interrupt or intrude on others?  no  Past Medical History:   Diagnosis Date    ADHD (attention deficit hyperactivity disorder)     Hypertension      Social History[1]  Past Surgical History:   Procedure Laterality Date    HERNIA REPAIR       Family History   Problem Relation Name Age of Onset    No Known Problems Mother      Melanoma Father       Current Medications[2]  Review of patient's allergies indicates:  No Known Allergies    Review of Systems   Constitutional:  Negative for activity change, appetite change, chills, diaphoresis, fatigue, fever and unexpected weight change.   HENT:  Negative for congestion, rhinorrhea, sinus pressure, sinus pain and sore throat.    Respiratory:  Negative for " "cough, chest tightness, shortness of breath and wheezing.    Cardiovascular:  Negative for chest pain and palpitations.   Gastrointestinal:  Negative for abdominal pain, nausea and vomiting.   Genitourinary:  Negative for dysuria, flank pain, frequency and urgency.   Musculoskeletal:  Negative for arthralgias, joint swelling, myalgias and neck pain.   Skin:  Negative for rash.   Neurological:  Negative for weakness, numbness and headaches.   Psychiatric/Behavioral:  Positive for sleep disturbance.           Blood pressure 133/88, pulse 71, height 6' 2" (1.88 m), weight 110.2 kg (243 lb), SpO2 99%. Body mass index is 31.2 kg/m².   Objective:      Physical Exam  Vitals reviewed.   Constitutional:       General: He is not in acute distress.     Appearance: Normal appearance. He is not ill-appearing or toxic-appearing.   HENT:      Head: Normocephalic and atraumatic.      Right Ear: Tympanic membrane normal.      Left Ear: Tympanic membrane normal.      Nose: No congestion or rhinorrhea.   Cardiovascular:      Rate and Rhythm: Normal rate and regular rhythm.      Heart sounds: Normal heart sounds. No murmur heard.  Pulmonary:      Effort: Pulmonary effort is normal. No respiratory distress.      Breath sounds: Normal breath sounds. No wheezing.   Skin:     General: Skin is warm and dry.      Capillary Refill: Capillary refill takes less than 2 seconds.   Neurological:      Mental Status: He is alert and oriented to person, place, and time.   Psychiatric:         Mood and Affect: Mood normal.         Thought Content: Thought content normal.         Judgment: Judgment normal.         Assessment:       1. Essential hypertension    2. Attention deficit disorder (ADD) without hyperactivity    3. Primary insomnia         Plan:           Essential hypertension  -     Lipid Panel; Future; Expected date: 03/10/2025  -     Comprehensive Metabolic Panel; Future; Expected date: 03/10/2025  Reduce the amount of salt in your diet; " Lose weight; Avoid drinking too much alcohol; Exercise at least 30 minutes per day most days of the week.  Continue lisinopril and home BP monitoring.    Attention deficit disorder (ADD) without hyperactivity  -     lisdexamfetamine (VYVANSE) 50 MG capsule; Take 1 capsule (50 mg total) by mouth every morning.  Dispense: 90 capsule; Refill: 0  ADHD medication refilled after  reviewed and no red flags noted. Patient reports improvement in symptoms with no significant side effects.     I explained the side effects of ADHD stimulants including emotional lability, seizures, arrhythmias, palpitations, hypertension, dizziness, syncope, appetite changes, weight loss, insomnia, addiction/dependency, elevated heart rate, headaches, anxiety/agitation, and worsening of underlying psychiatric conditions. Patient denies having history of cardiac disease. He denies chest pain upon exertion, dyspnea, nausea, vomiting, diaphoresis, and syncope. While ADHD medications do carry risk of arrhythmias, hypertension, neurological, and psychiatric complications, I feel that the benefits exceed the risks in this patient. A discussion was made going over the risks and benefits of the medication and after shared decision making they decided to continue their medication. They understood the choice, were able to express a  choice, appreciated the risks associated with it, and they had logical reasoning for their choice and demonstrated capacity.  reviewed and consistent with medication use as prescribed. The patient was cautioned to go to the emergency room for chest pain, severe headache, fainting, etc.  Patient was told to report any side effects or any symptoms to me immediately and they agreed to this plan. All questions were answered.          Primary insomnia  -     doxepin (SILENOR) 6 mg Tab; Take 6 mg by mouth every evening.  Dispense: 90 tablet; Refill: 0    instructed to establish a consistent sleep schedule by going to bed and  waking up at the same time every day, even on weekends. Create a relaxing bedtime routine, such as reading or taking a warm bath, and ensure your sleep environment is comfortable, dark, and quiet. Additionally, limit caffeine and screen time in the hours leading up to bedtime to promote better sleep quality.                              [1]   Social History  Socioeconomic History    Marital status:    Tobacco Use    Smoking status: Never    Smokeless tobacco: Never   Substance and Sexual Activity    Alcohol use: Yes     Comment: occ    Drug use: Never    Sexual activity: Yes     Partners: Female     Social Drivers of Health     Stress: No Stress Concern Present (9/9/2020)    [facility] [place] [facility] [employer] [facility] - Occupational Stress Questionnaire     Feeling of Stress : Only a little   [2]   Current Outpatient Medications   Medication Sig Dispense Refill    lisinopriL (PRINIVIL,ZESTRIL) 20 MG tablet Take 1 tablet (20 mg total) by mouth once daily. 90 tablet 3    doxepin (SILENOR) 6 mg Tab Take 6 mg by mouth every evening. 90 tablet 0    lisdexamfetamine (VYVANSE) 50 MG capsule Take 1 capsule (50 mg total) by mouth every morning. 90 capsule 0     No current facility-administered medications for this visit.

## 2025-04-01 ENCOUNTER — HOSPITAL ENCOUNTER (OUTPATIENT)
Dept: RADIOLOGY | Facility: HOSPITAL | Age: 50
Discharge: HOME | End: 2025-04-01
Attending: ORTHOPAEDIC SURGERY
Payer: COMMERCIAL

## 2025-04-01 ENCOUNTER — OFFICE VISIT (OUTPATIENT)
Dept: ORTHOPEDICS | Facility: CLINIC | Age: 50
End: 2025-04-01
Payer: COMMERCIAL

## 2025-04-01 VITALS — BODY MASS INDEX: 31.18 KG/M2 | HEIGHT: 74 IN | WEIGHT: 242.94 LBS

## 2025-04-01 DIAGNOSIS — M25.522 LEFT ELBOW PAIN: Primary | ICD-10-CM

## 2025-04-01 DIAGNOSIS — M25.522 LEFT ELBOW PAIN: ICD-10-CM

## 2025-04-01 DIAGNOSIS — M24.022 LOOSE BODY IN ELBOW JOINT, LEFT: Primary | ICD-10-CM

## 2025-04-01 PROCEDURE — 1159F MED LIST DOCD IN RCRD: CPT | Mod: CPTII,S$GLB,, | Performed by: ORTHOPAEDIC SURGERY

## 2025-04-01 PROCEDURE — 4010F ACE/ARB THERAPY RXD/TAKEN: CPT | Mod: CPTII,S$GLB,, | Performed by: ORTHOPAEDIC SURGERY

## 2025-04-01 PROCEDURE — 3008F BODY MASS INDEX DOCD: CPT | Mod: CPTII,S$GLB,, | Performed by: ORTHOPAEDIC SURGERY

## 2025-04-01 PROCEDURE — 99999 PR PBB SHADOW E&M-EST. PATIENT-LVL III: CPT | Mod: PBBFAC,,, | Performed by: ORTHOPAEDIC SURGERY

## 2025-04-01 PROCEDURE — 99204 OFFICE O/P NEW MOD 45 MIN: CPT | Mod: S$GLB,,, | Performed by: ORTHOPAEDIC SURGERY

## 2025-04-01 PROCEDURE — 73080 X-RAY EXAM OF ELBOW: CPT | Mod: TC,PO,LT

## 2025-04-01 PROCEDURE — 73080 X-RAY EXAM OF ELBOW: CPT | Mod: 26,LT,, | Performed by: RADIOLOGY

## 2025-04-01 NOTE — PROGRESS NOTES
Patient ID: Lucas Figueroa is a 49 y.o. male    Chief Complaint:   Chief Complaint   Patient presents with    Left Elbow - Pain       History of Present Illness:    Pleasant 49-year-old male here for evaluation of left elbow pain.  Reports a surgery in the past for removal of loose body of the left elbow.  Here also reports seeing another orthopedic surgeon in the past for tennis elbow.  He last saw them about 3 months ago.  He had an injection done.  Does state this helped.  However the injection has since worn off and reports pain in the lateral elbow.  He also has difficulty fully straightening the elbow and it has been like this for quite some time now.  Here to discuss further treatment options.    PAST MEDICAL HISTORY:   Past Medical History:   Diagnosis Date    ADHD (attention deficit hyperactivity disorder)     Hypertension      PAST SURGICAL HISTORY:   Past Surgical History:   Procedure Laterality Date    HERNIA REPAIR       FAMILY HISTORY:   Family History   Problem Relation Name Age of Onset    No Known Problems Mother      Melanoma Father       SOCIAL HISTORY:   Social History     Occupational History    Not on file   Tobacco Use    Smoking status: Never    Smokeless tobacco: Never   Substance and Sexual Activity    Alcohol use: Yes     Comment: occ    Drug use: Never    Sexual activity: Yes     Partners: Female        MEDICATIONS: Current Medications[1]  ALLERGIES: Review of patient's allergies indicates:  No Known Allergies      Physical Exam     There were no vitals filed for this visit.  Alert and oriented to person, place and time. No acute distress. Well-groomed, not ill appearing. Pupils round and reactive, normal respiratory effort, no audible wheezing.     On exam he has crepitus with flexion and extension of the elbow.  He has limited extension to 25°.  He has full passive pronation and supination with minimal pain.  Mild tenderness over the olecranon bursa and triceps distally.  Flexion  130°.  No mechanical instability with varus and valgus stress.  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  Possible loose bodies within the joint space.      Assessment & Plan    Loose body in elbow joint, left  -     MRI Elbow Joint Without Contrast Left; Future; Expected date: 04/01/2025         Treatment options were discussed with the patient in detail. We discussed the patient's current imaging as well as differential diagnosis in detail. Based on the patient's symptoms of failure of conservative treatment and concern for loose bodies and tennis elbow, I do believe an MRI of the left elbow is indicated to rule out damage to intra-articular structures including cartilage, tendons, and ligaments.     The patient will follow-up after MRI to discuss results and further treatment options. They will call the clinic with any questions or concerns.                    [1]   Current Outpatient Medications:     doxepin (SILENOR) 6 mg Tab, Take 6 mg by mouth every evening., Disp: 90 tablet, Rfl: 0    lisdexamfetamine (VYVANSE) 50 MG capsule, Take 1 capsule (50 mg total) by mouth every morning., Disp: 90 capsule, Rfl: 0    lisinopriL (PRINIVIL,ZESTRIL) 20 MG tablet, Take 1 tablet (20 mg total) by mouth once daily., Disp: 90 tablet, Rfl: 3

## 2025-04-07 ENCOUNTER — HOSPITAL ENCOUNTER (OUTPATIENT)
Dept: RADIOLOGY | Facility: HOSPITAL | Age: 50
Discharge: HOME | End: 2025-04-07
Attending: ORTHOPAEDIC SURGERY
Payer: COMMERCIAL

## 2025-04-07 DIAGNOSIS — M24.022 LOOSE BODY IN ELBOW JOINT, LEFT: ICD-10-CM

## 2025-04-07 PROCEDURE — 73221 MRI JOINT UPR EXTREM W/O DYE: CPT | Mod: TC,PO,LT

## 2025-04-07 PROCEDURE — 73221 MRI JOINT UPR EXTREM W/O DYE: CPT | Mod: 26,LT,, | Performed by: RADIOLOGY

## 2025-04-08 ENCOUNTER — OFFICE VISIT (OUTPATIENT)
Dept: ORTHOPEDICS | Facility: CLINIC | Age: 50
End: 2025-04-08
Payer: COMMERCIAL

## 2025-04-08 VITALS — WEIGHT: 242.94 LBS | HEIGHT: 74 IN | BODY MASS INDEX: 31.18 KG/M2

## 2025-04-08 DIAGNOSIS — M77.12 LATERAL EPICONDYLITIS, LEFT ELBOW: ICD-10-CM

## 2025-04-08 DIAGNOSIS — M19.022 ARTHRITIS OF LEFT ELBOW: Primary | ICD-10-CM

## 2025-04-08 PROCEDURE — 99999 PR PBB SHADOW E&M-EST. PATIENT-LVL II: CPT | Mod: PBBFAC,,, | Performed by: ORTHOPAEDIC SURGERY

## 2025-04-08 PROCEDURE — 99214 OFFICE O/P EST MOD 30 MIN: CPT | Mod: S$GLB,,, | Performed by: ORTHOPAEDIC SURGERY

## 2025-04-08 PROCEDURE — 3008F BODY MASS INDEX DOCD: CPT | Mod: CPTII,S$GLB,, | Performed by: ORTHOPAEDIC SURGERY

## 2025-04-08 PROCEDURE — 1159F MED LIST DOCD IN RCRD: CPT | Mod: CPTII,S$GLB,, | Performed by: ORTHOPAEDIC SURGERY

## 2025-04-08 PROCEDURE — 4010F ACE/ARB THERAPY RXD/TAKEN: CPT | Mod: CPTII,S$GLB,, | Performed by: ORTHOPAEDIC SURGERY

## 2025-04-08 NOTE — PROGRESS NOTES
Patient ID: Lucas Figueroa is a 49 y.o. male    Chief Complaint:   Chief Complaint   Patient presents with    Left Elbow - Pain       History of Present Illness:    Pleasant 49-year-old male here for evaluation of left elbow pain.  Reports a surgery in the past for removal of loose body of the left elbow.  Here also reports seeing another orthopedic surgeon in the past for tennis elbow.  He last saw them about 3 months ago.  He had an injection done.  Does state this helped.  However the injection has since worn off and reports pain in the lateral elbow.  He also has difficulty fully straightening the elbow and it has been like this for quite some time now.  Here to discuss further treatment options.    ____________________________________________________________________    Interval history 04/08/2025 : Patient returns today for MRI follow-up of their left elbow.  They report no changes since I saw them last.  Pain somewhat better, 2/10.    PAST MEDICAL HISTORY:   Past Medical History:   Diagnosis Date    ADHD (attention deficit hyperactivity disorder)     Hypertension      PAST SURGICAL HISTORY:   Past Surgical History:   Procedure Laterality Date    HERNIA REPAIR       FAMILY HISTORY:   Family History   Problem Relation Name Age of Onset    No Known Problems Mother      Melanoma Father       SOCIAL HISTORY:   Social History     Occupational History    Not on file   Tobacco Use    Smoking status: Never    Smokeless tobacco: Never   Substance and Sexual Activity    Alcohol use: Yes     Comment: occ    Drug use: Never    Sexual activity: Yes     Partners: Female        MEDICATIONS: Current Medications[1]  ALLERGIES: Review of patient's allergies indicates:  No Known Allergies      Physical Exam     There were no vitals filed for this visit.  Alert and oriented to person, place and time. No acute distress. Well-groomed, not ill appearing. Pupils round and reactive, normal respiratory effort, no audible wheezing.      On exam he has crepitus with flexion and extension of the elbow.  He has limited extension to 25°.  He has full passive pronation and supination with minimal pain.  Mild tenderness over the olecranon bursa and triceps distally.  Flexion 130°.  No mechanical instability with varus and valgus stress.  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  Possible loose bodies within the joint space.    MRI elbow:  1. Intermediate signal hyperintensity and thickening of the RCL could reflect remote posttraumatic or degenerative changes.  2. There partial articular surface tears of the common extensor tendon and common flexor tendon near their attachment to the humerus.  3. Small elbow joint effusion.  4. Moderate osteoarthropathy of the elbow.  5. Mild fluid in the olecranon versus is consistent with olecranon bursitis with regional subcutaneous edema.    Assessment & Plan    Arthritis of left elbow    Lateral epicondylitis, left elbow         I made the decision to obtain old records of the patient including previous notes and imaging. New imaging, if ordered today of the extremity or extremities, were evaluated. I independently reviewed and interpreted the radiographs and/or MRIs/CT scan today as well as prior imaging. I also reviewed the referring provider's documentation as well as any pertinent labs, tests and imaging.     Lucas Figueroa is a 49 y.o. male with left elbow DJD, lateral epicondylitis    Treatment options were discussed in detail with the patient. We discussed multiple options including non-operative and operative treatment options.   Non-operatively we discussed bracing, physical therapy and injections.  Operatively we discussed open tennis elbow procedure versus Tenex and the expectations of surgery long term as well as the rehabilitation associated with surgery as well as risks and benefits of surgery.     After shared medical decision-making with the patient, patient would like to proceed with a trial of:     Tenex procedure.  We will place a referral to Dr. Ohara to see if he is a candidate for this.    We can consider open debridement/repair if pain is refractory to conservative treatment    All questions were answered and patient is agreeable to the above plan.                    [1]   Current Outpatient Medications:     doxepin (SILENOR) 6 mg Tab, Take 6 mg by mouth every evening., Disp: 90 tablet, Rfl: 0    lisdexamfetamine (VYVANSE) 50 MG capsule, Take 1 capsule (50 mg total) by mouth every morning., Disp: 90 capsule, Rfl: 0    lisinopriL (PRINIVIL,ZESTRIL) 20 MG tablet, Take 1 tablet (20 mg total) by mouth once daily., Disp: 90 tablet, Rfl: 3

## 2025-04-14 ENCOUNTER — OFFICE VISIT (OUTPATIENT)
Dept: ORTHOPEDICS | Facility: CLINIC | Age: 50
End: 2025-04-14
Payer: COMMERCIAL

## 2025-04-14 VITALS — BODY MASS INDEX: 31.18 KG/M2 | WEIGHT: 242.94 LBS | HEIGHT: 74 IN

## 2025-04-14 DIAGNOSIS — M77.12 LATERAL EPICONDYLITIS, LEFT ELBOW: Primary | ICD-10-CM

## 2025-04-14 DIAGNOSIS — M25.522 LEFT ELBOW PAIN: ICD-10-CM

## 2025-04-14 PROCEDURE — 99214 OFFICE O/P EST MOD 30 MIN: CPT | Mod: S$GLB,,, | Performed by: FAMILY MEDICINE

## 2025-04-14 PROCEDURE — 3008F BODY MASS INDEX DOCD: CPT | Mod: CPTII,S$GLB,, | Performed by: FAMILY MEDICINE

## 2025-04-14 PROCEDURE — 99999 PR PBB SHADOW E&M-EST. PATIENT-LVL III: CPT | Mod: PBBFAC,,, | Performed by: FAMILY MEDICINE

## 2025-04-14 PROCEDURE — 4010F ACE/ARB THERAPY RXD/TAKEN: CPT | Mod: CPTII,S$GLB,, | Performed by: FAMILY MEDICINE

## 2025-04-14 PROCEDURE — 1159F MED LIST DOCD IN RCRD: CPT | Mod: CPTII,S$GLB,, | Performed by: FAMILY MEDICINE

## 2025-04-14 NOTE — PROGRESS NOTES
Subjective     Patient ID: Lucas Figueroa is a 49 y.o. male.    Chief Complaint: Consult (R) ELBOW TENEX CONSULT  REF)    49-year-old man here today with complaints of left-sided elbow pain.  This has an issue that has bothered him off and on for years but has become acutely worse over the last few months.  Reports a history of surgery in the left elbow for loose body removal from prior injury.  Over the last several months he has been having pain in the lateral and posterior aspect of his elbow.  No recent injury to the area but he has noted a change in his physical activity.  He mainly notices aggravation of the elbow when exercising with weights or also when playing his guitar.  Reports seeing another provider and having an injection in his elbow several months ago which did provide some relief but eventually wore off.  Most recently seen by Dr. Rojas who had an MRI of his elbow done.  Here today to discuss Tenex as a possible treatment option.        Review of Systems   Constitutional: Negative for chills and decreased appetite.   HENT:  Negative for congestion and sore throat.    Eyes:  Negative for blurred vision.   Cardiovascular:  Negative for chest pain, dyspnea on exertion and palpitations.   Respiratory:  Negative for cough and shortness of breath.    Skin:  Negative for rash.   Neurological:  Negative for difficulty with concentration, disturbances in coordination and headaches.   Psychiatric/Behavioral:  Negative for altered mental status, depression, hallucinations, memory loss and suicidal ideas.           Objective     General    Nursing note and vitals reviewed.  Constitutional: He is oriented to person, place, and time. He appears well-developed and well-nourished.   HENT:   Nose: Nose normal.   Eyes: EOM are normal. Pupils are equal, round, and reactive to light.   Neck: Neck supple.   Cardiovascular:  Normal rate.            Pulmonary/Chest: Effort normal.   Abdominal: Soft.    Neurological: He is alert and oriented to person, place, and time. He has normal reflexes.   Psychiatric: He has a normal mood and affect. His behavior is normal. Judgment and thought content normal.             Right Hand/Wrist Exam     Other     Neuorologic Exam    Ulnar Distribution: normal  Radial Distribution: normal      Left Hand/Wrist Exam     Other     Sensory Exam  Ulnar Distribution: normal  Radial Distribution: normal      Right Elbow Exam   Right elbow exam is normal.    Inspection   Scars: absent  Effusion: absent    Range of Motion   Extension:  normal   Flexion:  normal   Pronation:  normal   Supination:  normal     Tests   Varus: negative  Valgus: negative  Tinel's sign (cubital tunnel): negative  Tennis Elbow: moderate and negative  Golfer's Elbow: negative    Other   Sensation: normal      Left Elbow Exam   Left elbow exam is normal.    Inspection   Effusion: present    Pain   The patient exhibits pain of the lateral epicondyle    Swelling   The patient is swollen on the lateral epicondyle    Tenderness   The patient is tender to palpation of the lateral epicondyle.     Range of Motion   Extension:  20   Flexion:  140   Pronation:  normal   Supination:  normal     Tests   Varus: negative  Valgus: negative  Tinel's sign (cubital tunnel): negative  Tennis Elbow: moderate    Other   Sensation: normal        Muscle Strength   Right Upper Extremity   Wrist extension: 5/5   Wrist flexion: 5/5   : 5/5   Elbow Pronation:  5/5   Elbow Supination:  5/5   Elbow Extension: 5/5  Elbow Flexion: 5/5  Left Upper Extremity  Wrist extension: 5/5   Wrist flexion: 5/5   Elbow Pronation:  5/5   Elbow Supination:  5/5   Elbow Extension: 5/5  Elbow Flexion: 5/5    Vascular Exam     Right Pulses      Radial:                    2+        Physical Exam  Vitals and nursing note reviewed.   Constitutional:       Appearance: He is well-developed and well-nourished.   HENT:      Nose: Nose normal.   Eyes:       Extraocular Movements: EOM normal.      Pupils: Pupils are equal, round, and reactive to light.   Cardiovascular:      Rate and Rhythm: Normal rate.      Pulses:           Radial pulses are 2+ on the right side.   Pulmonary:      Effort: Pulmonary effort is normal.   Abdominal:      Palpations: Abdomen is soft.   Musculoskeletal:      Right elbow: No effusion.      Left elbow: Effusion present.      Right hand: Normal sensation of the ulnar distribution and radial distribution.      Left hand: Normal sensation of the ulnar distribution and radial distribution.      Cervical back: Normal range of motion and neck supple.   Neurological:      Mental Status: He is alert and oriented to person, place, and time.      Deep Tendon Reflexes: Reflexes are normal and symmetric.   Psychiatric:         Mood and Affect: Mood and affect normal.         Behavior: Behavior normal.         Thought Content: Thought content normal.         Judgment: Judgment normal.        EXAMINATION:  MRI ELBOW WITHOUT CONTRAST LEFT     CLINICAL HISTORY:  Elbow trauma, nondiagnostic xray;Elbow trauma, neurovasc/lig/tendon injury suspected; Loose body in left elbow     TECHNIQUE:  Multiplanar multi sequential MRI of the left elbow without IV contrast.     COMPARISON:  Elbow x-ray 04/01/2024..     FINDINGS:  There is intermediate signal hyperintensity and thickening of the RCL.  The LUCL and LCL are intact.     There is a partial articular surface tearing of the common extensor tendon near its attachment to the humerus.  There is focal articular surface indistinctness of the common flexor tendon near its attachment to the humerus.  The biceps and brachialis tendons are intact.  The triceps tendon is intact.  Visualized muscles of the elbow are within normal limits.     There is a small elbow joint effusion.  There is moderate radiocapitellar and ulnar humeral articular cartilage thinning with associated osteophytosis.  There is triceps enthesopathy at  the olecranon process.  No other abnormal bone marrow signal observed.     There is mild fluid in the olecranon bursa.  Mild stranding subcutaneous T2 hyperintense fluid of the posterior elbow also noted, likely reflecting edema.     Impression:     1. Intermediate signal hyperintensity and thickening of the RCL could reflect remote posttraumatic or degenerative changes.  2. There partial articular surface tears of the common extensor tendon and common flexor tendon near their attachment to the humerus.  3. Small elbow joint effusion.  4. Moderate osteoarthropathy of the elbow.  5. Mild fluid in the olecranon versus is consistent with olecranon bursitis with regional subcutaneous edema.        Assessment and Plan     Encounter Diagnoses   Name Primary?    Lateral epicondylitis, left elbow Yes    Left elbow pain          Lucas was seen today for consult.    Diagnoses and all orders for this visit:    Lateral epicondylitis, left elbow  -     Tenotomy elbow; Future    Left elbow pain  -     Tenotomy elbow; Future      I reviewed all relevant notes and imaging in clinic today with him.  We discussed the Tenex procedure in detail.  I do believe he would be a good candidate for percutaneous tenotomy.  He is interested in moving forward with this.  Going to submit prior authorization and schedule him procedure once approved.

## 2025-05-09 DIAGNOSIS — F51.01 PRIMARY INSOMNIA: ICD-10-CM

## 2025-05-09 NOTE — TELEPHONE ENCOUNTER
Refill Routing Note   Medication(s) are not appropriate for processing by [facility] for the following reason(s):        New or recently adjusted medication    [facility] action(s):  Defer     Requires labs : Yes             Appointments  past 12m or future 3m with PCP    Date Provider   Last Visit   3/10/2025 William Plaza MD   Next Visit   6/10/2025 William Plaza MD   ED visits in past 90 days: 0        Note composed:2:22 PM 05/09/2025

## 2025-05-09 NOTE — TELEPHONE ENCOUNTER
Care Due:                  Date            Visit Type   Department     Provider  --------------------------------------------------------------------------------                                VANDANA MACIAS [facility]      [address]  Last Visit: 03-      [facility] ([facility])   [facility]                              EP - [facility] [address]  Next Visit: 06-      [facility] ([facility])   [facility]                                                            Last  Test          Frequency    Reason                     Performed    Due Date  --------------------------------------------------------------------------------    CMP.........  12 months..  lisinopriL...............  03- 03-    [facility] Embedded Care Due Messages. Reference number: [id number].   5/09/2025 10:58:04 AM RACHELT

## 2025-05-11 RX ORDER — DOXEPIN 6 MG/1
6 TABLET, FILM COATED ORAL NIGHTLY
Qty: 90 TABLET | Refills: 3 | Status: SHIPPED | OUTPATIENT
Start: 2025-05-11

## 2025-05-22 ENCOUNTER — OFFICE VISIT (OUTPATIENT)
Dept: ORTHOPEDICS | Facility: CLINIC | Age: 50
End: 2025-05-22
Payer: COMMERCIAL

## 2025-05-22 DIAGNOSIS — M25.522 LEFT ELBOW PAIN: ICD-10-CM

## 2025-05-22 DIAGNOSIS — G89.18 POST PROCEDURE DISCOMFORT: ICD-10-CM

## 2025-05-22 DIAGNOSIS — M77.12 LATERAL EPICONDYLITIS, LEFT ELBOW: Primary | ICD-10-CM

## 2025-05-22 PROCEDURE — 99999 PR PBB SHADOW E&M-EST. PATIENT-LVL I: CPT | Mod: PBBFAC,,, | Performed by: FAMILY MEDICINE

## 2025-05-22 RX ORDER — IBUPROFEN 800 MG/1
800 TABLET, FILM COATED ORAL 3 TIMES DAILY PRN
Qty: 30 TABLET | Refills: 0 | Status: SHIPPED | OUTPATIENT
Start: 2025-05-22

## 2025-05-22 RX ORDER — HYDROCODONE BITARTRATE AND ACETAMINOPHEN 5; 325 MG/1; MG/1
1 TABLET ORAL EVERY 6 HOURS PRN
Qty: 20 TABLET | Refills: 0 | Status: SHIPPED | OUTPATIENT
Start: 2025-05-22

## 2025-05-22 NOTE — PROGRESS NOTES
Tenex L Elbow    Date/Time: 5/22/2025 10:20 AM    Performed by: William Ohara MD  Authorized by: William Ohara MD    Consent Done?:  Yes (Written)  Indications:  Pain  Site marked: The procedure site was marked    Timeout: Prior to procedure the correct patient, procedure, and site was verified      Location:  Elbow    Local anesthesia used?: Yes    Local anesthetic:  Lidocaine 1% without epinephrine and bupivacaine 0.25% without epinephrine  Anesthetic total (ml):  12    Elbow joint: L common extensor tendons.  Prep: Patient was prepped and draped in usual sterile fashion    Ultrasonic Guidance for needle placement: Yes  Images are saved and documented.    Needle size:  22 G  Patient tolerance:  Patient tolerated the procedure well with no immediate complications       Additional Comments: Patient was brought into the procedure room and placed on the operating table in a prone position, with the involved extremity placed in a pronated position. A sterile sleeve was placed over the ultrasound transducer. The anatomy was identified, and the diseased tissue was visualized & confirmed. The ultrasonic sharp cutting tool was setup for use and a 500cc irrigation bag was installed. The aspiration and irrigation system was primed to confirm appropriate function. After an appropriate timeout was performed for the left  elbow, the area was prepped with an antimicrobial solution, draped in the usual sterile fashion and the area was injected a local anesthetic. A skin wheal was placed, and the involved tissue was anesthetized with fast-acting local anesthetic.     Ultrasound imaging was utilized to visualize the common extensor tendon. The diseased and frayed portions of the tendon were identified. The abnormal contour of the tendon was visualized as well. With ultrasound guidance, a small incision was made and carried through the skin and subcutaneous tissue - long enough to accommodate the ultrasonic sharp cutting tool just  proximal to the lateral epicondyle of the left elbow. Care was taken to avoid all vital neurovascular structures and any small bleeders were cauterized. The ultrasonic sharp cutting tool was then inserted through the incision and, with ultrasound imaging guidance, advanced to the level of the pathologic tendon.     The tenotomy was performed by applying the ultrasonic sharp cutting tool to all areas of diseased and frayed tendon to cut away and remove pathologic tendon. In addition to diseased/frayed tendon removal, debulking of the tendon was performed, using ultrasound imaging to guide this, until the remaining tendon was smooth and properly contoured, and the  common extensor tendon had been repaired. Applied cutting time was 3:06. After the sharp cutting was complete, ultrasound imaging was used to ensure all areas of diseased and frayed tendon were addressed. Ultrasound was utilized throughout the procedure to confirm precise localization.     The surgical site was then thoroughly irrigated and aspirated to ensure soft tissue debris removal. Upon completion of the procedure the deeper and subcutaneous tissues we reapproximated using steri strips, dermabond, and a sterile, water impervious dressing was applied, followed by a sterile Kerlix and non-sterile ace bandage. The patient tolerated the procedure well and transferred to the recovery room in good condition. Detailed post- procedure instructions and a return appointment were given. The patient was advised to follow all post-procedure protocols, take post-procedure pain medication as needed, and to return to clinic in 3 weeks for follow up.

## 2025-05-26 ENCOUNTER — LAB VISIT (OUTPATIENT)
Dept: LAB | Facility: HOSPITAL | Age: 50
End: 2025-05-26
Attending: FAMILY MEDICINE
Payer: COMMERCIAL

## 2025-05-26 DIAGNOSIS — I10 ESSENTIAL HYPERTENSION: ICD-10-CM

## 2025-05-26 LAB
ALBUMIN SERPL BCP-MCNC: 3.9 G/DL (ref 3.5–5.2)
ALP SERPL-CCNC: 78 UNIT/L (ref 40–150)
ALT SERPL W/O P-5'-P-CCNC: 31 UNIT/L (ref 10–44)
ANION GAP (OHS): 11 MMOL/L (ref 8–16)
AST SERPL-CCNC: 19 UNIT/L (ref 11–45)
BILIRUB SERPL-MCNC: 0.7 MG/DL (ref 0.1–1)
BUN SERPL-MCNC: 16 MG/DL (ref 6–20)
CALCIUM SERPL-MCNC: 9.2 MG/DL (ref 8.7–10.5)
CHLORIDE SERPL-SCNC: 105 MMOL/L (ref 95–110)
CHOLEST SERPL-MCNC: 190 MG/DL (ref 120–199)
CHOLEST/HDLC SERPL: 3.5 {RATIO} (ref 2–5)
CO2 SERPL-SCNC: 25 MMOL/L (ref 23–29)
CREAT SERPL-MCNC: 1.1 MG/DL (ref 0.5–1.4)
GFR SERPLBLD CREATININE-BSD FMLA CKD-EPI: >60 ML/MIN/1.73/M2
GLUCOSE SERPL-MCNC: 85 MG/DL (ref 70–110)
HDLC SERPL-MCNC: 54 MG/DL (ref 40–75)
HDLC SERPL: 28.4 % (ref 20–50)
LDLC SERPL CALC-MCNC: 104.8 MG/DL (ref 63–159)
NONHDLC SERPL-MCNC: 136 MG/DL
POTASSIUM SERPL-SCNC: 4.5 MMOL/L (ref 3.5–5.1)
PROT SERPL-MCNC: 7.2 GM/DL (ref 6–8.4)
SODIUM SERPL-SCNC: 141 MMOL/L (ref 136–145)
TRIGL SERPL-MCNC: 156 MG/DL (ref 30–150)

## 2025-05-26 PROCEDURE — 80061 LIPID PANEL: CPT

## 2025-05-26 PROCEDURE — 80053 COMPREHEN METABOLIC PANEL: CPT

## 2025-05-26 PROCEDURE — 36415 COLL VENOUS BLD VENIPUNCTURE: CPT | Mod: PN

## 2025-06-02 ENCOUNTER — RESULTS FOLLOW-UP (OUTPATIENT)
Dept: FAMILY MEDICINE | Facility: CLINIC | Age: 50
End: 2025-06-02

## 2025-06-10 ENCOUNTER — OFFICE VISIT (OUTPATIENT)
Dept: FAMILY MEDICINE | Facility: CLINIC | Age: 50
End: 2025-06-10
Payer: COMMERCIAL

## 2025-06-10 VITALS
SYSTOLIC BLOOD PRESSURE: 116 MMHG | DIASTOLIC BLOOD PRESSURE: 87 MMHG | HEART RATE: 59 BPM | OXYGEN SATURATION: 98 % | WEIGHT: 242.31 LBS | HEIGHT: 74 IN | BODY MASS INDEX: 31.1 KG/M2

## 2025-06-10 DIAGNOSIS — I10 ESSENTIAL HYPERTENSION: ICD-10-CM

## 2025-06-10 DIAGNOSIS — F98.8 ATTENTION DEFICIT DISORDER (ADD) WITHOUT HYPERACTIVITY: ICD-10-CM

## 2025-06-10 PROCEDURE — 3074F SYST BP LT 130 MM HG: CPT | Mod: CPTII,S$GLB,, | Performed by: FAMILY MEDICINE

## 2025-06-10 PROCEDURE — 3008F BODY MASS INDEX DOCD: CPT | Mod: CPTII,S$GLB,, | Performed by: FAMILY MEDICINE

## 2025-06-10 PROCEDURE — 1159F MED LIST DOCD IN RCRD: CPT | Mod: CPTII,S$GLB,, | Performed by: FAMILY MEDICINE

## 2025-06-10 PROCEDURE — 99214 OFFICE O/P EST MOD 30 MIN: CPT | Mod: S$GLB,,, | Performed by: FAMILY MEDICINE

## 2025-06-10 PROCEDURE — 4010F ACE/ARB THERAPY RXD/TAKEN: CPT | Mod: CPTII,S$GLB,, | Performed by: FAMILY MEDICINE

## 2025-06-10 PROCEDURE — 3079F DIAST BP 80-89 MM HG: CPT | Mod: CPTII,S$GLB,, | Performed by: FAMILY MEDICINE

## 2025-06-10 PROCEDURE — 99999 PR PBB SHADOW E&M-EST. PATIENT-LVL III: CPT | Mod: PBBFAC,,, | Performed by: FAMILY MEDICINE

## 2025-06-10 RX ORDER — LISDEXAMFETAMINE DIMESYLATE 50 MG/1
50 CAPSULE ORAL EVERY MORNING
Qty: 90 CAPSULE | Refills: 0 | Status: SHIPPED | OUTPATIENT
Start: 2025-06-10

## 2025-06-10 RX ORDER — LISINOPRIL 20 MG/1
20 TABLET ORAL DAILY
Qty: 90 TABLET | Refills: 3 | Status: SHIPPED | OUTPATIENT
Start: 2025-06-10 | End: 2026-06-10

## 2025-06-10 NOTE — PROGRESS NOTES
"  SUBJECTIVE:    Patient ID: Lucas Figueroa is a 49 y.o. male.    Chief Complaint: ADD  48 yo male here today to follow up on his ADD. Patient doing well on the Vyvanse patient states that his work is improved is improved he denies any chest pain shortness of breath weight loss dry mouth or headaches.       SPMHX:  Allergic Rhinitis: Zyrtec 10mg  ADD: Vyvanse 40mg  HTN: Started on Lisinopril/HCTZ 10/12.5 controlled.     Specialists:  Ortho: Dr Salamanca  Hand:  Dr Paulino     Smoke: None  ETOH: 5 a week  Exercise: Crossfit 4 days a week      HPI    ADHD Adult: On medications  Have difficulty sustaining attention in tasks or fun activities?  No  Don't follow through on instructions and fail to finish work?  No  Have difficulty organizing tasks and activities?  No  Avoid, dislike, or are reluctant to engage in work thar requires sustained mental effort? No  Easily distracted?  No  Forgetful in daily activities?  No  Fidget with hands or feet, or squirm in seat?  no  Have difficulty engaging in leisure activities or doing fun things quietly?  No  Feel "on the go" or "driven by a motor"?  no  Blurt out answers before questions have been completed?  no  Have difficulty waiting your turn, are impatient?  no  Interrupt or intrude on others?  no        Past Medical History:   Diagnosis Date    ADHD (attention deficit hyperactivity disorder)     Hypertension      Social History[1]  Past Surgical History:   Procedure Laterality Date    HERNIA REPAIR       Family History   Problem Relation Name Age of Onset    No Known Problems Mother      Melanoma Father       Current Medications[2]  Review of patient's allergies indicates:  No Known Allergies    Review of Systems   Constitutional:  Negative for activity change, appetite change, chills, diaphoresis, fatigue, fever and unexpected weight change.   HENT:  Negative for congestion, rhinorrhea, sinus pressure, sinus pain and sore throat.    Respiratory:  Negative for cough, chest " "tightness, shortness of breath and wheezing.    Cardiovascular:  Negative for chest pain and palpitations.   Gastrointestinal:  Negative for abdominal pain, nausea and vomiting.   Genitourinary:  Negative for dysuria, flank pain, frequency and urgency.   Musculoskeletal:  Negative for arthralgias, joint swelling, myalgias and neck pain.   Skin:  Negative for rash.   Neurological:  Negative for weakness, numbness and headaches.   Psychiatric/Behavioral:  Negative for sleep disturbance.           Blood pressure 116/87, pulse (!) 59, height 6' 2" (1.88 m), weight 109.9 kg (242 lb 4.6 oz), SpO2 98%. Body mass index is 31.11 kg/m².   Objective:      Physical Exam  Vitals reviewed.   Constitutional:       General: He is not in acute distress.     Appearance: Normal appearance. He is not ill-appearing or toxic-appearing.   HENT:      Head: Normocephalic and atraumatic.      Right Ear: Tympanic membrane normal.      Left Ear: Tympanic membrane normal.      Nose: No congestion or rhinorrhea.   Cardiovascular:      Rate and Rhythm: Normal rate and regular rhythm.      Heart sounds: Normal heart sounds. No murmur heard.  Pulmonary:      Effort: Pulmonary effort is normal. No respiratory distress.      Breath sounds: Normal breath sounds. No wheezing.   Skin:     General: Skin is warm and dry.      Capillary Refill: Capillary refill takes less than 2 seconds.   Neurological:      Mental Status: He is alert and oriented to person, place, and time.   Psychiatric:         Mood and Affect: Mood normal.         Thought Content: Thought content normal.         Judgment: Judgment normal.         Assessment:       1. Attention deficit disorder (ADD) without hyperactivity    2. Essential hypertension         Plan:           Attention deficit disorder (ADD) without hyperactivity  -     lisdexamfetamine (VYVANSE) 50 MG capsule; Take 1 capsule (50 mg total) by mouth every morning.  Dispense: 90 capsule; Refill: 0  ADHD medication refilled " after  reviewed and no red flags noted. Patient reports improvement in symptoms with no significant side effects.     I explained the side effects of ADHD stimulants including emotional lability, seizures, arrhythmias, palpitations, hypertension, dizziness, syncope, appetite changes, weight loss, insomnia, addiction/dependency, elevated heart rate, headaches, anxiety/agitation, and worsening of underlying psychiatric conditions. Patient denies having history of cardiac disease. He denies chest pain upon exertion, dyspnea, nausea, vomiting, diaphoresis, and syncope. While ADHD medications do carry risk of arrhythmias, hypertension, neurological, and psychiatric complications, I feel that the benefits exceed the risks in this patient. A discussion was made going over the risks and benefits of the medication and after shared decision making they decided to continue their medication. They understood the choice, were able to express a  choice, appreciated the risks associated with it, and they had logical reasoning for their choice and demonstrated capacity.  reviewed and consistent with medication use as prescribed. The patient was cautioned to go to the emergency room for chest pain, severe headache, fainting, etc.  Patient was told to report any side effects or any symptoms to me immediately and they agreed to this plan. All questions were answered.              Essential hypertension  -     lisinopriL (PRINIVIL,ZESTRIL) 20 MG tablet; Take 1 tablet (20 mg total) by mouth once daily.  Dispense: 90 tablet; Refill: 3  Reduce the amount of salt in your diet; Lose weight; Avoid drinking too much alcohol; Exercise at least 30 minutes per day most days of the week.  Continue lisinopril and home BP monitoring.                               [1]   Social History  Socioeconomic History    Marital status:    Tobacco Use    Smoking status: Never    Smokeless tobacco: Never   Substance and Sexual Activity    Alcohol  use: Yes     Comment: occ    Drug use: Never    Sexual activity: Yes     Partners: Female     Social Drivers of Health     Stress: No Stress Concern Present (9/9/2020)    [employer] - Occupational Stress Questionnaire     Feeling of Stress : Only a little   [2]   Current Outpatient Medications   Medication Sig Dispense Refill    doxepin (SILENOR) 6 mg Tab Take 6 mg by mouth every evening. 90 tablet 3    lisdexamfetamine (VYVANSE) 50 MG capsule Take 1 capsule (50 mg total) by mouth every morning. 90 capsule 0    lisinopriL (PRINIVIL,ZESTRIL) 20 MG tablet Take 1 tablet (20 mg total) by mouth once daily. 90 tablet 3     No current facility-administered medications for this visit.

## 2025-06-11 ENCOUNTER — TELEPHONE (OUTPATIENT)
Dept: ORTHOPEDICS | Facility: CLINIC | Age: 50
End: 2025-06-11
Payer: COMMERCIAL

## 2025-06-19 ENCOUNTER — OFFICE VISIT (OUTPATIENT)
Dept: ORTHOPEDICS | Facility: CLINIC | Age: 50
End: 2025-06-19
Payer: COMMERCIAL

## 2025-06-19 VITALS — HEIGHT: 74 IN | BODY MASS INDEX: 31.1 KG/M2 | WEIGHT: 242.31 LBS

## 2025-06-19 DIAGNOSIS — G89.18 POST PROCEDURE DISCOMFORT: ICD-10-CM

## 2025-06-19 DIAGNOSIS — M25.522 LEFT ELBOW PAIN: ICD-10-CM

## 2025-06-19 DIAGNOSIS — M70.22 OLECRANON BURSITIS OF LEFT ELBOW: ICD-10-CM

## 2025-06-19 DIAGNOSIS — M77.12 LATERAL EPICONDYLITIS, LEFT ELBOW: Primary | ICD-10-CM

## 2025-06-19 PROCEDURE — 99999 PR PBB SHADOW E&M-EST. PATIENT-LVL III: CPT | Mod: PBBFAC,,, | Performed by: FAMILY MEDICINE

## 2025-06-19 RX ORDER — METHYLPREDNISOLONE ACETATE 80 MG/ML
80 INJECTION, SUSPENSION INTRA-ARTICULAR; INTRALESIONAL; INTRAMUSCULAR; SOFT TISSUE
Status: DISCONTINUED | OUTPATIENT
Start: 2025-06-19 | End: 2025-06-19 | Stop reason: HOSPADM

## 2025-06-19 RX ADMIN — METHYLPREDNISOLONE ACETATE 80 MG: 80 INJECTION, SUSPENSION INTRA-ARTICULAR; INTRALESIONAL; INTRAMUSCULAR; SOFT TISSUE at 11:06

## 2025-06-19 NOTE — PROGRESS NOTES
Subjective     Patient ID: Lucas Figueroa is a 49 y.o. male.    Chief Complaint: Follow-up (L ELBOW TENEX F/U DOP 5/22/25)    Returns status post three weeks Tenex procedure done on left elbow for lateral epicondylitis.  He has noted improvement in his pain since the procedure.  He is not have any discomfort with rest.  Still has some mild discomfort with use but is overall better than it was preprocedure.  Does note to have swelling in the area of the olecranon consistent with olecranon bursitis.  This is not painful but he does find it annoying.      Review of Systems   Constitutional: Negative for chills and decreased appetite.   HENT:  Negative for congestion and sore throat.    Eyes:  Negative for blurred vision.   Cardiovascular:  Negative for chest pain, dyspnea on exertion and palpitations.   Respiratory:  Negative for cough and shortness of breath.    Skin:  Negative for rash.   Neurological:  Negative for difficulty with concentration, disturbances in coordination and headaches.   Psychiatric/Behavioral:  Negative for altered mental status, depression, hallucinations, memory loss and suicidal ideas.           Objective     General    Nursing note and vitals reviewed.  Constitutional: He is oriented to person, place, and time. He appears well-developed and well-nourished.   HENT:   Nose: Nose normal.   Eyes: EOM are normal. Pupils are equal, round, and reactive to light.   Neck: Neck supple.   Cardiovascular:  Normal rate.            Pulmonary/Chest: Effort normal.   Abdominal: Soft.   Neurological: He is alert and oriented to person, place, and time. He has normal reflexes.   Psychiatric: He has a normal mood and affect. His behavior is normal. Judgment and thought content normal.             Right Hand/Wrist Exam     Other     Neuorologic Exam    Ulnar Distribution: normal  Radial Distribution: normal      Left Hand/Wrist Exam     Other     Sensory Exam  Ulnar Distribution: normal  Radial Distribution:  normal      Right Elbow Exam   Right elbow exam is normal.    Inspection   Scars: absent  Effusion: absent    Range of Motion   Extension:  normal   Flexion:  normal   Pronation:  normal   Supination:  normal     Tests   Varus: negative  Valgus: negative  Tinel's sign (cubital tunnel): negative  Tennis Elbow: moderate and negative  Golfer's Elbow: negative    Other   Sensation: normal      Left Elbow Exam   Left elbow exam is normal.    Inspection   Effusion: present    Pain   The patient exhibits pain of the lateral epicondyle    Swelling   The patient is swollen on the lateral epicondyle and olecranon    Tenderness   The patient is tender to palpation of the lateral epicondyle.     Range of Motion   Extension:  20   Flexion:  140   Pronation:  normal   Supination:  normal     Tests   Varus: negative  Valgus: negative  Tinel's sign (cubital tunnel): negative  Tennis Elbow: negative    Other   Sensation: normal        Muscle Strength   Right Upper Extremity   Wrist extension: 5/5   Wrist flexion: 5/5   : 5/5   Elbow Pronation:  5/5   Elbow Supination:  5/5   Elbow Extension: 5/5  Elbow Flexion: 5/5  Left Upper Extremity  Wrist extension: 5/5   Wrist flexion: 5/5   Elbow Pronation:  5/5   Elbow Supination:  5/5   Elbow Extension: 5/5  Elbow Flexion: 5/5    Vascular Exam     Right Pulses      Radial:                    2+      Physical Exam  Vitals and nursing note reviewed.   Constitutional:       Appearance: He is well-developed and well-nourished.   HENT:      Nose: Nose normal.   Eyes:      Extraocular Movements: EOM normal.      Pupils: Pupils are equal, round, and reactive to light.   Cardiovascular:      Rate and Rhythm: Normal rate.      Pulses:           Radial pulses are 2+ on the right side.   Pulmonary:      Effort: Pulmonary effort is normal.   Abdominal:      Palpations: Abdomen is soft.   Musculoskeletal:      Right elbow: No effusion.      Left elbow: Effusion present.      Right hand: Normal  sensation of the ulnar distribution and radial distribution.      Left hand: Normal sensation of the ulnar distribution and radial distribution.      Cervical back: Neck supple.   Neurological:      Mental Status: He is alert and oriented to person, place, and time.      Deep Tendon Reflexes: Reflexes are normal and symmetric.   Psychiatric:         Mood and Affect: Mood and affect normal.         Behavior: Behavior normal.         Thought Content: Thought content normal.         Judgment: Judgment normal.           Assessment and Plan     Encounter Diagnoses   Name Primary?    Lateral epicondylitis, left elbow Yes    Left elbow pain     Post procedure discomfort     Olecranon bursitis of left elbow          Lucas was seen today for follow-up.    Diagnoses and all orders for this visit:    Lateral epicondylitis, left elbow    Left elbow pain    Post procedure discomfort    Olecranon bursitis of left elbow      Doing well postprocedure.  Going to give him a home exercise program for stretching and range-of-motion exercises for his elbow.  Will have him follow up in three weeks for final recheck.      For his olecranon bursitis we offered to aspirate and inject it today and he agreed with this.     Intermediate Joint Aspiration/Injection: L olecranon bursa    Date/Time: 6/19/2025 11:20 AM    Performed by: William Ohara MD  Authorized by: William Ohara MD    Consent Done?:  Yes (Verbal)  Indications:  Arthritis  Site marked: The procedure site was marked    Timeout: Prior to procedure the correct patient, procedure, and site was verified      Location:  Elbow    Local anesthesia used?: Yes    Local anesthetic:  Topical anesthetic and lidocaine 1% without epinephrine  Anesthetic total (ml):  2    Site:  L olecranon bursa  Prep: Patient was prepped and draped in usual sterile fashion    Ultrasonic Guidance for needle placement: No  Needle size:  18 G  Approach:  Dorsal  Medications:  80 mg methylPREDNISolone acetate  80 mg/mL  Aspirate amount (ml):  12  Aspirate:  Clear and blood-tinged  Patient tolerance:  Patient tolerated the procedure well with no immediate complications

## 2025-07-11 ENCOUNTER — OFFICE VISIT (OUTPATIENT)
Dept: ORTHOPEDICS | Facility: CLINIC | Age: 50
End: 2025-07-11
Payer: COMMERCIAL

## 2025-07-11 VITALS — BODY MASS INDEX: 31.1 KG/M2 | HEIGHT: 74 IN | WEIGHT: 242.31 LBS

## 2025-07-11 DIAGNOSIS — M77.12 LATERAL EPICONDYLITIS, LEFT ELBOW: Primary | ICD-10-CM

## 2025-07-11 DIAGNOSIS — M25.522 LEFT ELBOW PAIN: ICD-10-CM

## 2025-07-11 PROCEDURE — 99999 PR PBB SHADOW E&M-EST. PATIENT-LVL III: CPT | Mod: PBBFAC,,, | Performed by: FAMILY MEDICINE

## 2025-07-11 NOTE — PROGRESS NOTES
Subjective     Patient ID: Lucas Figueroa is a 49 y.o. male.    Chief Complaint: Follow-up ( 3WK L) ELBOW TENEX F/U DOP 5/22/)    Returns today six weeks status post Tenex procedure on left elbow.  He is doing very well.  He has almost no pain in his elbow just some mild discomfort when fuse.  It feels more like a tightness in his forearm then pain he was experiencing before.  Has been compliant with a home exercise program and states that he did not see some benefit from there.    Follow-up  Pertinent negatives include no chest pain, chills, congestion, coughing, headaches, rash or sore throat.       Review of Systems   Constitutional: Negative for chills and decreased appetite.   HENT:  Negative for congestion and sore throat.    Eyes:  Negative for blurred vision.   Cardiovascular:  Negative for chest pain, dyspnea on exertion and palpitations.   Respiratory:  Negative for cough and shortness of breath.    Skin:  Negative for rash.   Neurological:  Negative for difficulty with concentration, disturbances in coordination and headaches.   Psychiatric/Behavioral:  Negative for altered mental status, depression, hallucinations, memory loss and suicidal ideas.           Objective     General    Nursing note and vitals reviewed.  Constitutional: He is oriented to person, place, and time. He appears well-developed and well-nourished.   HENT:   Nose: Nose normal.   Eyes: EOM are normal. Pupils are equal, round, and reactive to light.   Neck: Neck supple.   Cardiovascular:  Normal rate.            Pulmonary/Chest: Effort normal.   Abdominal: Soft.   Neurological: He is alert and oriented to person, place, and time. He has normal reflexes.   Psychiatric: He has a normal mood and affect. His behavior is normal. Judgment and thought content normal.             Right Hand/Wrist Exam     Other     Neuorologic Exam    Ulnar Distribution: normal  Radial Distribution: normal      Left Hand/Wrist Exam     Other     Sensory  Exam  Ulnar Distribution: normal  Radial Distribution: normal      Right Elbow Exam   Right elbow exam is normal.    Inspection   Scars: absent  Effusion: absent    Range of Motion   Extension:  normal   Flexion:  normal   Pronation:  normal   Supination:  normal     Tests   Varus: negative  Valgus: negative  Tinel's sign (cubital tunnel): negative  Tennis Elbow: moderate and negative  Golfer's Elbow: negative    Other   Sensation: normal      Left Elbow Exam   Left elbow exam is normal.    Inspection   Effusion: absent    Range of Motion   Extension:  20   Flexion:  140   Pronation:  normal   Supination:  normal     Tests   Varus: negative  Valgus: negative  Tinel's sign (cubital tunnel): negative  Tennis Elbow: negative    Other   Sensation: normal        Muscle Strength   Right Upper Extremity   Wrist extension: 5/5   Wrist flexion: 5/5   : 5/5   Elbow Pronation:  5/5   Elbow Supination:  5/5   Elbow Extension: 5/5  Elbow Flexion: 5/5  Left Upper Extremity  Wrist extension: 5/5   Wrist flexion: 5/5   Elbow Pronation:  5/5   Elbow Supination:  5/5   Elbow Extension: 5/5  Elbow Flexion: 5/5    Vascular Exam     Right Pulses      Radial:                    2+      Physical Exam  Vitals and nursing note reviewed.   Constitutional:       Appearance: He is well-developed and well-nourished.   HENT:      Nose: Nose normal.   Eyes:      Extraocular Movements: EOM normal.      Pupils: Pupils are equal, round, and reactive to light.   Cardiovascular:      Rate and Rhythm: Normal rate.      Pulses:           Radial pulses are 2+ on the right side.   Pulmonary:      Effort: Pulmonary effort is normal.   Abdominal:      Palpations: Abdomen is soft.   Musculoskeletal:      Right elbow: No effusion.      Left elbow: No effusion.      Right hand: Normal sensation of the ulnar distribution and radial distribution.      Left hand: Normal sensation of the ulnar distribution and radial distribution.      Cervical back: Neck  supple.   Neurological:      Mental Status: He is alert and oriented to person, place, and time.      Deep Tendon Reflexes: Reflexes are normal and symmetric.   Psychiatric:         Mood and Affect: Mood and affect normal.         Behavior: Behavior normal.         Thought Content: Thought content normal.         Judgment: Judgment normal.             Assessment and Plan     Encounter Diagnoses   Name Primary?    Lateral epicondylitis, left elbow Yes    Left elbow pain          Lucas was seen today for follow-up.    Diagnoses and all orders for this visit:    Lateral epicondylitis, left elbow    Left elbow pain      He is doing very well postprocedure.  He may return to all activity as tolerated with no restrictions.  He did have multiple other issues in his elbow seen on MRI.  Clinically they did not seem to be bothering him but we did discuss that they could become an issue down the line.  If he has any returning pain I would advise following up here as needed and we will address those at that time.

## 2025-07-30 ENCOUNTER — TELEPHONE (OUTPATIENT)
Dept: ORTHOPEDICS | Facility: CLINIC | Age: 50
End: 2025-07-30
Payer: COMMERCIAL

## 2025-07-30 ENCOUNTER — OFFICE VISIT (OUTPATIENT)
Dept: ORTHOPEDICS | Facility: CLINIC | Age: 50
End: 2025-07-30
Payer: COMMERCIAL

## 2025-07-30 DIAGNOSIS — M77.12 LATERAL EPICONDYLITIS, LEFT ELBOW: ICD-10-CM

## 2025-07-30 DIAGNOSIS — M19.022 ARTHRITIS OF LEFT ELBOW: Primary | ICD-10-CM

## 2025-07-30 DIAGNOSIS — M25.522 LEFT ELBOW PAIN: ICD-10-CM

## 2025-07-30 DIAGNOSIS — M24.022 LOOSE BODY IN ELBOW JOINT, LEFT: ICD-10-CM

## 2025-07-30 NOTE — PROGRESS NOTES
Subjective     Patient ID: Lucas Figueroa is a 49 y.o. male.    Chief Complaint: No chief complaint on file.    Patient returns today with recurrent left-sided elbow pain.  Previously it seen him for this where an MRI showed multiple pathologies of the elbow.  Of which would included lateral epicondylitis which we attempted the Tenex procedure on.  This did significantly improve the pain in the area of the lateral epicondyle.  He had limited pain in the elbow until he started trying to use it again recently.  He has started noticing that exercise and activity is making his elbow painful with swelling particularly around the area of the olecranon.  He is still does not have pain in the lateral epicondyle region.  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  Does report a history of a loose body removal from the elbow about 10 years ago done by Dr. Cassidy.        Review of Systems   Constitutional: Negative for chills and decreased appetite.   HENT:  Negative for congestion and sore throat.    Eyes:  Negative for blurred vision.   Cardiovascular:  Negative for chest pain, dyspnea on exertion and palpitations.   Respiratory:  Negative for cough and shortness of breath.    Skin:  Negative for rash.   Neurological:  Negative for difficulty with concentration, disturbances in coordination and headaches.   Psychiatric/Behavioral:  Negative for altered mental status, depression, hallucinations, memory loss and suicidal ideas.           Objective     General    Nursing note and vitals reviewed.  Constitutional: He is oriented to person, place, and time. He appears well-developed and well-nourished.   HENT:   Nose: Nose normal.   Eyes: EOM are normal. Pupils are equal, round, and reactive to light.   Neck: Neck supple.   Cardiovascular:  Normal rate.            Pulmonary/Chest: Effort normal.   Abdominal: Soft.   Neurological: He is  alert and oriented to person, place, and time. He has normal reflexes.   Psychiatric: He has a normal mood and affect. His behavior is normal. Judgment and thought content normal.             Right Hand/Wrist Exam     Other     Neuorologic Exam    Ulnar Distribution: normal  Radial Distribution: normal      Left Hand/Wrist Exam     Other     Sensory Exam  Ulnar Distribution: normal  Radial Distribution: normal      Right Elbow Exam   Right elbow exam is normal.    Inspection   Scars: absent  Effusion: absent    Range of Motion   Extension:  normal   Flexion:  normal   Pronation:  normal   Supination:  normal     Tests   Varus: negative  Valgus: negative  Tinel's sign (cubital tunnel): negative  Tennis Elbow: moderate and negative  Golfer's Elbow: negative    Other   Sensation: normal      Left Elbow Exam   Left elbow exam is normal.    Inspection   Effusion: absent    Range of Motion   Extension:  20   Flexion:  140   Pronation:  normal   Supination:  normal     Tests   Varus: negative  Valgus: negative  Tinel's sign (cubital tunnel): negative  Tennis Elbow: negative    Other   Sensation: normal        Muscle Strength   Right Upper Extremity   Wrist extension: 5/5   Wrist flexion: 5/5   : 5/5   Elbow Pronation:  5/5   Elbow Supination:  5/5   Elbow Extension: 5/5  Elbow Flexion: 5/5  Left Upper Extremity  Wrist extension: 5/5   Wrist flexion: 5/5   Elbow Pronation:  5/5   Elbow Supination:  5/5   Elbow Extension: 5/5  Elbow Flexion: 5/5    Vascular Exam     Right Pulses      Radial:                    2+        Physical Exam  Vitals and nursing note reviewed.   Constitutional:       Appearance: He is well-developed and well-nourished.   HENT:      Nose: Nose normal.   Eyes:      Extraocular Movements: EOM normal.      Pupils: Pupils are equal, round, and reactive to light.   Cardiovascular:      Rate and Rhythm: Normal rate.      Pulses:           Radial pulses are 2+ on the right side.   Pulmonary:      Effort:  Pulmonary effort is normal.   Abdominal:      Palpations: Abdomen is soft.   Musculoskeletal:      Right elbow: No effusion.      Left elbow: No effusion.      Right hand: Normal sensation of the ulnar distribution and radial distribution.      Left hand: Normal sensation of the ulnar distribution and radial distribution.      Cervical back: Normal range of motion and neck supple.   Neurological:      Mental Status: He is alert and oriented to person, place, and time.      Deep Tendon Reflexes: Reflexes are normal and symmetric.   Psychiatric:         Mood and Affect: Mood and affect normal.         Behavior: Behavior normal.         Thought Content: Thought content normal.         Judgment: Judgment normal.             Assessment and Plan     Encounter Diagnoses   Name Primary?    Lateral epicondylitis, left elbow     Left elbow pain     Arthritis of left elbow Yes    Loose body in elbow joint, left          Diagnoses and all orders for this visit:    Arthritis of left elbow    Lateral epicondylitis, left elbow    Left elbow pain    Loose body in elbow joint, left    With multiple pathologies on MRI including loose bodies as well as degenerative arthritis in the elbow along with a possible ligament tear I believe his best option for pain relief would probably be surgical intervention.  Discussed this with him in detail.  We are going to schedule him a visit with Dr. Stratton to discuss this further.

## 2025-07-30 NOTE — TELEPHONE ENCOUNTER
----- Message from Jesus Stratton MD sent at 7/30/2025  3:01 PM CDT -----  He needs to see Bob again first. If Kel wants him to see me after that, then its fine  ----- Message -----  From: Daisy Vallejo LPN  Sent: 7/30/2025   2:50 PM CDT  To: MD Dr. Bob Bravo sent pt to [facility] to do tenex   [facility] is now referring pt to you  Please review  ----- Message -----  From: Ana Martins LPN  Sent: 7/30/2025  10:21 AM CDT  To: ZEB Mondragon Dr.Rhodes would like to get this pt in with .

## 2025-07-31 ENCOUNTER — OFFICE VISIT (OUTPATIENT)
Dept: URGENT CARE | Facility: CLINIC | Age: 50
End: 2025-07-31
Payer: COMMERCIAL

## 2025-07-31 ENCOUNTER — TELEPHONE (OUTPATIENT)
Dept: ORTHOPEDICS | Facility: CLINIC | Age: 50
End: 2025-07-31
Payer: COMMERCIAL

## 2025-07-31 VITALS
SYSTOLIC BLOOD PRESSURE: 110 MMHG | DIASTOLIC BLOOD PRESSURE: 78 MMHG | RESPIRATION RATE: 20 BRPM | OXYGEN SATURATION: 99 % | WEIGHT: 242 LBS | HEART RATE: 93 BPM | HEIGHT: 74 IN | TEMPERATURE: 97 F | BODY MASS INDEX: 31.06 KG/M2

## 2025-07-31 DIAGNOSIS — J22 LOWER RESPIRATORY INFECTION: Primary | ICD-10-CM

## 2025-07-31 DIAGNOSIS — R09.81 SINUS CONGESTION: ICD-10-CM

## 2025-07-31 DIAGNOSIS — R05.1 ACUTE COUGH: ICD-10-CM

## 2025-07-31 DIAGNOSIS — R09.82 PND (POST-NASAL DRIP): ICD-10-CM

## 2025-07-31 PROCEDURE — 71046 X-RAY EXAM CHEST 2 VIEWS: CPT | Mod: S$GLB,,, | Performed by: RADIOLOGY

## 2025-07-31 PROCEDURE — 99204 OFFICE O/P NEW MOD 45 MIN: CPT | Mod: S$GLB,,,

## 2025-07-31 RX ORDER — AMOXICILLIN AND CLAVULANATE POTASSIUM 875; 125 MG/1; MG/1
1 TABLET, FILM COATED ORAL EVERY 12 HOURS
Qty: 14 TABLET | Refills: 0 | Status: SHIPPED | OUTPATIENT
Start: 2025-07-31 | End: 2025-08-07

## 2025-07-31 RX ORDER — CETIRIZINE HYDROCHLORIDE 10 MG/1
10 TABLET ORAL DAILY
Qty: 30 TABLET | Refills: 0 | Status: SHIPPED | OUTPATIENT
Start: 2025-07-31

## 2025-07-31 RX ORDER — PREDNISONE 20 MG/1
20 TABLET ORAL 2 TIMES DAILY
Qty: 10 TABLET | Refills: 0 | Status: SHIPPED | OUTPATIENT
Start: 2025-07-31 | End: 2025-08-05

## 2025-07-31 RX ORDER — FLUTICASONE PROPIONATE 50 MCG
1 SPRAY, SUSPENSION (ML) NASAL DAILY
Qty: 15.8 ML | Refills: 0 | Status: SHIPPED | OUTPATIENT
Start: 2025-07-31

## 2025-07-31 RX ORDER — BENZONATATE 200 MG/1
200 CAPSULE ORAL 3 TIMES DAILY PRN
Qty: 30 CAPSULE | Refills: 0 | Status: SHIPPED | OUTPATIENT
Start: 2025-07-31 | End: 2025-08-10

## 2025-07-31 NOTE — PATIENT INSTRUCTIONS
Symptomatic treatment to include:     Rest, increase fluid intake to include electrolyte replacement  Ibuprofen/Tylenol as directed for fever, sore throat, body aches  Zrytec and flonase for sinus symptoms  Prednisone as prescribed  Antibiotics as prescribed   Warm, salt water gargles, over the counter throat lozenges or sprays as desires.   ER for difficulty breathing not relieved by rest, excessive lethargy and/or change in mental status

## 2025-07-31 NOTE — TELEPHONE ENCOUNTER
----- Message from Nurse Perez sent at 7/30/2025  4:39 PM CDT -----    ----- Message -----  From: Daisy Vallejo LPN  Sent: 7/30/2025   3:03 PM CDT  To: Ana Martins LPN    Needs to see Bob first per Dr. Stratton  ----- Message -----  From: Jesus Stratton MD  Sent: 7/30/2025   3:02 PM CDT  To: Daisy Vallejo LPN    He needs to see Bob again first. If Kel wants him to see me after that, then its fine  ----- Message -----  From: Daisy Vallejo LPN  Sent: 7/30/2025   2:50 PM CDT  To: MD Dr. Bob Bravo sent pt to [facility] to do tenex   [facility] is now referring pt to you  Please review  ----- Message -----  From: Ana Martins LPN  Sent: 7/30/2025  10:21 AM CDT  To: ZEB Mondragon Dr.Rhodes would like to get this pt in with .

## 2025-07-31 NOTE — PROGRESS NOTES
"Subjective:      Patient ID: Lucas Figueroa is a 49 y.o. male.    Vitals:  height is 6' 2" (1.88 m) and weight is 109.8 kg (242 lb). His oral temperature is 97 °F (36.1 °C). His blood pressure is 110/78 and his pulse is 93. His respiration is 20 and oxygen saturation is 99%.     Chief Complaint: Cough    49 y.o. male presents for evaluation of cough for about the last month. He reports initially cough was productive of sputum but that resolved and he just has a lingering dry cough. Denies fever, chills, sweats, SOB/difficulty breathing, or painful cough. Lungs CTA. Pt endorses a "tickle" in his throat precipitating the cough. He does feel a PND.     Cough  Episode onset: x 1 month. Associated symptoms include postnasal drip and a sore throat. Pertinent negatives include no chills, fever, shortness of breath or wheezing.       Constitution: Negative for chills, fatigue and fever.   HENT:  Positive for congestion, postnasal drip and sore throat.    Respiratory:  Positive for cough. Negative for chest tightness, sputum production, shortness of breath and wheezing.       Objective:     Physical Exam   Constitutional: He is oriented to person, place, and time. He appears well-developed. He is cooperative.  Non-toxic appearance. He does not appear ill. No distress.   HENT:   Head: Normocephalic and atraumatic.   Ears:   Right Ear: Hearing, external ear and ear canal normal. A middle ear effusion is present.   Left Ear: Hearing, external ear and ear canal normal. A middle ear effusion is present.   Nose: Congestion present. No mucosal edema, rhinorrhea or nasal deformity. No epistaxis. Right sinus exhibits no maxillary sinus tenderness and no frontal sinus tenderness. Left sinus exhibits no maxillary sinus tenderness and no frontal sinus tenderness.   Mouth/Throat: Uvula is midline and mucous membranes are normal. Mucous membranes are moist. No trismus in the jaw. Normal dentition. No uvula swelling. Posterior " oropharyngeal erythema (mucus drainage) present. No oropharyngeal exudate or posterior oropharyngeal edema.   Eyes: Conjunctivae and lids are normal. No scleral icterus.   Neck: Trachea normal and phonation normal. Neck supple. No edema present. No erythema present. No neck rigidity present.   Cardiovascular: Normal rate, regular rhythm and normal heart sounds.   Pulmonary/Chest: Effort normal and breath sounds normal. No respiratory distress. He has no decreased breath sounds. He has no wheezes. He has no rhonchi. He has no rales.   Abdominal: Normal appearance.   Musculoskeletal: Normal range of motion.         General: No deformity. Normal range of motion.   Neurological: He is alert and oriented to person, place, and time. He displays no weakness. He exhibits normal muscle tone.   Skin: Skin is warm, dry, intact, not diaphoretic and not pale.   Psychiatric: His speech is normal and behavior is normal. Judgment and thought content normal.   Nursing note and vitals reviewed.      Assessment:     1. Lower respiratory infection    2. Acute cough    3. Sinus congestion    4. PND (post-nasal drip)        Plan:       Lower respiratory infection  -     amoxicillin-clavulanate 875-125mg (AUGMENTIN) 875-125 mg per tablet; Take 1 tablet by mouth every 12 (twelve) hours. for 7 days  Dispense: 14 tablet; Refill: 0  -     predniSONE (DELTASONE) 20 MG tablet; Take 1 tablet (20 mg total) by mouth 2 (two) times daily. for 5 days  Dispense: 10 tablet; Refill: 0    Acute cough  -     XR CHEST PA AND LATERAL; Future; Expected date: 07/31/2025  -     benzonatate (TESSALON) 200 MG capsule; Take 1 capsule (200 mg total) by mouth 3 (three) times daily as needed for Cough.  Dispense: 30 capsule; Refill: 0    Sinus congestion  -     fluticasone propionate (FLONASE) 50 mcg/actuation nasal spray; 1 spray (50 mcg total) by Each Nostril route once daily.  Dispense: 15.8 mL; Refill: 0  -     cetirizine (ZYRTEC) 10 MG tablet; Take 1 tablet (10  mg total) by mouth once daily.  Dispense: 30 tablet; Refill: 0    PND (post-nasal drip)      Chest Xray negative for pneumonia.     Discussed medication with patient who acknowledges understanding and is agreeable to POC. Follow up with primary care. Increase fluid intake. Red flags for ER discussed.

## 2025-08-04 ENCOUNTER — OFFICE VISIT (OUTPATIENT)
Dept: ORTHOPEDICS | Facility: CLINIC | Age: 50
End: 2025-08-04
Payer: COMMERCIAL

## 2025-08-04 VITALS — RESPIRATION RATE: 16 BRPM

## 2025-08-04 DIAGNOSIS — M19.022 ARTHRITIS OF LEFT ELBOW: Primary | ICD-10-CM

## 2025-08-04 DIAGNOSIS — M70.22 OLECRANON BURSITIS OF LEFT ELBOW: ICD-10-CM

## 2025-08-04 PROCEDURE — 99999 PR PBB SHADOW E&M-EST. PATIENT-LVL III: CPT | Mod: PBBFAC,,, | Performed by: ORTHOPAEDIC SURGERY

## 2025-08-04 PROCEDURE — 1160F RVW MEDS BY RX/DR IN RCRD: CPT | Mod: CPTII,S$GLB,, | Performed by: ORTHOPAEDIC SURGERY

## 2025-08-04 PROCEDURE — 99214 OFFICE O/P EST MOD 30 MIN: CPT | Mod: 57,S$GLB,, | Performed by: ORTHOPAEDIC SURGERY

## 2025-08-04 PROCEDURE — 4010F ACE/ARB THERAPY RXD/TAKEN: CPT | Mod: CPTII,S$GLB,, | Performed by: ORTHOPAEDIC SURGERY

## 2025-08-04 PROCEDURE — 1159F MED LIST DOCD IN RCRD: CPT | Mod: CPTII,S$GLB,, | Performed by: ORTHOPAEDIC SURGERY

## 2025-08-04 NOTE — PROGRESS NOTES
Past Medical History:   Diagnosis Date    ADHD (attention deficit hyperactivity disorder)     Hypertension        Past Surgical History:   Procedure Laterality Date    HERNIA REPAIR         Current Outpatient Medications   Medication Sig    amoxicillin-clavulanate 875-125mg (AUGMENTIN) 875-125 mg per tablet Take 1 tablet by mouth every 12 (twelve) hours. for 7 days    benzonatate (TESSALON) 200 MG capsule Take 1 capsule (200 mg total) by mouth 3 (three) times daily as needed for Cough.    cetirizine (ZYRTEC) 10 MG tablet Take 1 tablet (10 mg total) by mouth once daily.    doxepin (SILENOR) 6 mg Tab Take 6 mg by mouth every evening.    fluticasone propionate (FLONASE) 50 mcg/actuation nasal spray 1 spray (50 mcg total) by Each Nostril route once daily.    lisdexamfetamine (VYVANSE) 50 MG capsule Take 1 capsule (50 mg total) by mouth every morning.    lisinopriL (PRINIVIL,ZESTRIL) 20 MG tablet Take 1 tablet (20 mg total) by mouth once daily.    predniSONE (DELTASONE) 20 MG tablet Take 1 tablet (20 mg total) by mouth 2 (two) times daily. for 5 days     No current facility-administered medications for this visit.       Review of patient's allergies indicates:  No Known Allergies    Family History   Problem Relation Name Age of Onset    No Known Problems Mother      Melanoma Father         Social History[1]    Chief Complaint:   Chief Complaint   Patient presents with    Left Elbow - Pain       History of present illness:  49-year-old right-hand-dominant male seen for chronic left elbow pain.  Patient has had pain for about 15 years.  Had a previous loose body removal by Dr. Cassidy for some osteoarthritis in a bone spur.  Recently has been getting injections and even had Tenex for some lateral epicondylitis.  Most of his pain is posterior medially.  Unable to lift things with his arm.  Range of motion is very restricted.  Wakes him up at night.  Pain is an 8/10.    Prior treatment:  Surgery, injections, Tenex,  NSAIDs        Review of Systems:    Constitution: Negative for chills, fever, and sweats.  Negative for unexplained weight loss.    HENT:  Negative for headaches and blurry vision.    Cardiovascular:Negative for chest pain or irregular heart beat. Negative for hypertension.    Respiratory:  Negative for cough and shortness of breath.    Gastrointestinal: Negative for abdominal pain, heartburn, melena, nausea, and vomitting.    Genitourinary:  Negative bladder incontinence and dysuria.    Musculoskeletal:  See HPI    Neurological: Negative for numbness.    Psychiatric/Behavioral: Negative for depression.  The patient is not nervous/anxious.      Endocrine: Negative for polyuria    Hematologic/Lymphatic: Negative for bleeding problem.  Does not bruise/bleed easily.    Skin: Negative for poor would healing and rash      Physical Examination:    Vital Signs:    Vitals:    08/04/25 0828   Resp: 16       There is no height or weight on file to calculate BMI.    This a well-developed, well nourished patient in no acute distress.  They are alert and oriented and cooperative to examination.  Pt. walks without an antalgic gait.      Examination left elbow shows no rashes or erythema.  Patient has a small olecranon bursal effusion.  Range of motion is very restricted.  Patient is about 30° to 95°.  No catching.  Tender to palpation over the posterior medial olecranon.  Patient denies ulnar nerve symptoms down into the hand.  Small olecranon spur palpated    Heart is regular rate without obvious murmurs   Normal respiratory effort without audible wheezing  Abdomen is soft and nontender         X-rays:  X-rays of his left elbow are available for review which show significant arthritic changes of the elbow joint with spurring both posteriorly along the olecranon as well as medially around the UCL.    MRI of the left elbow is reviewed and interpreted: 1. Intermediate signal hyperintensity and thickening of the RCL could reflect  remote posttraumatic or degenerative changes.  2. There partial articular surface tears of the common extensor tendon and common flexor tendon near their attachment to the humerus.  3. Small elbow joint effusion.  4. Moderate osteoarthropathy of the elbow.  5. Mild fluid in the olecranon versus is consistent with olecranon bursitis with regional subcutaneous edema.         Assessment:  Left olecranon bursitis with posterior elbow pain    Plan:  I reviewed the findings with him today.  We talked about treatment options.  The patient could likely benefit from olecranon bursectomy with the olecranon bone spur removal.  We will consider ulnar nerve release without transposition if that appears involved.  Also talked about possible triceps repair depending on the size of the olecranon spur.  Risks, benefits, and alternatives to the procedure were explained to the patient including but not limited to damage to nerves, arteries, blood vessels, bones, tendons, ligaments, stiffness, instability, infection, permanent limb dysfunction, DVT, PE, as well as general anesthetic complications including seizure, stroke, heart attack and even death. The patient understood these risks and wished to proceed and signed the informed consent.               All previous pertinent notes including ER visits, physical therapy visits, other orthopedic visits as well as other care for the same musculoskeletal problem were reviewed.  All pertinent lab values and previous imaging was reviewed pertinent to the current visit.    This note was created using [employer] voice recognition software that occasionally misinterpreted phrases or words.    Consult note is delivered via Epic messaging service.           [1]   Social History  Socioeconomic History    Marital status:    Tobacco Use    Smoking status: Never    Smokeless tobacco: Never   Substance and Sexual Activity    Alcohol use: Yes     Comment: occ    Drug use: Never    Sexual activity:  Yes     Partners: Female     Social Drivers of Health     Stress: No Stress Concern Present (9/9/2020)    [employer] - Occupational Stress Questionnaire     Feeling of Stress : Only a little

## 2025-08-05 DIAGNOSIS — Z01.818 PRE-OP TESTING: ICD-10-CM

## 2025-08-05 DIAGNOSIS — M70.22 OLECRANON BURSITIS OF LEFT ELBOW: Primary | ICD-10-CM

## 2025-08-05 RX ORDER — MUPIROCIN 20 MG/G
OINTMENT TOPICAL
OUTPATIENT
Start: 2025-08-05

## 2025-08-18 ENCOUNTER — PATIENT MESSAGE (OUTPATIENT)
Dept: FAMILY MEDICINE | Facility: CLINIC | Age: 50
End: 2025-08-18
Payer: COMMERCIAL

## 2025-08-18 ENCOUNTER — HOSPITAL ENCOUNTER (OUTPATIENT)
Dept: PREADMISSION TESTING | Facility: HOSPITAL | Age: 50
Discharge: HOME | End: 2025-08-18
Attending: ORTHOPAEDIC SURGERY
Payer: COMMERCIAL

## 2025-08-18 DIAGNOSIS — M70.22 OLECRANON BURSITIS OF LEFT ELBOW: ICD-10-CM

## 2025-08-18 DIAGNOSIS — Z01.818 PREOP TESTING: ICD-10-CM

## 2025-08-18 DIAGNOSIS — Z01.818 PREOP TESTING: Primary | ICD-10-CM

## 2025-08-18 DIAGNOSIS — Z01.818 PRE-OP TESTING: ICD-10-CM

## 2025-08-18 LAB
ABSOLUTE EOSINOPHIL (SMH): 0.37 K/UL
ABSOLUTE MONOCYTE (SMH): 0.67 K/UL (ref 0.3–1)
ABSOLUTE NEUTROPHIL COUNT (SMH): 3 K/UL (ref 1.8–7.7)
ANION GAP (SMH): 7 MMOL/L (ref 8–16)
BASOPHILS # BLD AUTO: 0.06 K/UL
BASOPHILS NFR BLD AUTO: 1 %
BUN SERPL-MCNC: 22 MG/DL (ref 6–20)
CALCIUM SERPL-MCNC: 9.4 MG/DL (ref 8.7–10.5)
CHLORIDE SERPL-SCNC: 108 MMOL/L (ref 95–110)
CO2 SERPL-SCNC: 25 MMOL/L (ref 23–29)
CREAT SERPL-MCNC: 1.1 MG/DL (ref 0.5–1.4)
ERYTHROCYTE [DISTWIDTH] IN BLOOD BY AUTOMATED COUNT: 13 % (ref 11.5–14.5)
GFR SERPLBLD CREATININE-BSD FMLA CKD-EPI: >60 ML/MIN/1.73/M2
GLUCOSE SERPL-MCNC: 95 MG/DL (ref 70–110)
HCT VFR BLD AUTO: 36.8 % (ref 40–54)
HGB BLD-MCNC: 12.5 GM/DL (ref 14–18)
IMM GRANULOCYTES # BLD AUTO: 0.01 K/UL (ref 0–0.04)
IMM GRANULOCYTES NFR BLD AUTO: 0.2 % (ref 0–0.5)
LYMPHOCYTES # BLD AUTO: 1.97 K/UL (ref 1–4.8)
MCH RBC QN AUTO: 30.4 PG (ref 27–31)
MCHC RBC AUTO-ENTMCNC: 34 G/DL (ref 32–36)
MCV RBC AUTO: 90 FL (ref 82–98)
NUCLEATED RBC (/100WBC) (SMH): 0 /100 WBC
OHS QRS DURATION: 90 MS
OHS QTC CALCULATION: 437 MS
PLATELET # BLD AUTO: 369 K/UL (ref 150–450)
PMV BLD AUTO: 8.9 FL (ref 9.2–12.9)
POTASSIUM SERPL-SCNC: 4 MMOL/L (ref 3.5–5.1)
RBC # BLD AUTO: 4.11 M/UL (ref 4.6–6.2)
RELATIVE EOSINOPHIL (SMH): 6.1 % (ref 0–8)
RELATIVE LYMPHOCYTE (SMH): 32.6 % (ref 18–48)
RELATIVE MONOCYTE (SMH): 11.1 % (ref 4–15)
RELATIVE NEUTROPHIL (SMH): 49 % (ref 38–73)
SODIUM SERPL-SCNC: 140 MMOL/L (ref 136–145)
WBC # BLD AUTO: 6.05 K/UL (ref 3.9–12.7)

## 2025-08-18 PROCEDURE — 82374 ASSAY BLOOD CARBON DIOXIDE: CPT | Performed by: ORTHOPAEDIC SURGERY

## 2025-08-18 PROCEDURE — 93010 ELECTROCARDIOGRAM REPORT: CPT | Mod: ,,, | Performed by: INTERNAL MEDICINE

## 2025-08-18 PROCEDURE — 85025 COMPLETE CBC W/AUTO DIFF WBC: CPT | Performed by: ORTHOPAEDIC SURGERY

## 2025-08-18 PROCEDURE — 36415 COLL VENOUS BLD VENIPUNCTURE: CPT | Performed by: ORTHOPAEDIC SURGERY

## 2025-08-18 PROCEDURE — 93005 ELECTROCARDIOGRAM TRACING: CPT

## 2025-08-24 ENCOUNTER — ANESTHESIA EVENT (OUTPATIENT)
Dept: SURGERY | Facility: HOSPITAL | Age: 50
End: 2025-08-24
Payer: COMMERCIAL

## 2025-08-25 DIAGNOSIS — M70.22 OLECRANON BURSITIS OF LEFT ELBOW: Primary | ICD-10-CM

## 2025-08-25 RX ORDER — ACETAMINOPHEN 500 MG
1000 TABLET ORAL EVERY 8 HOURS PRN
Qty: 30 TABLET | Refills: 0 | Status: SHIPPED | OUTPATIENT
Start: 2025-08-25

## 2025-08-25 RX ORDER — CYCLOBENZAPRINE HCL 10 MG
10 TABLET ORAL 3 TIMES DAILY PRN
Qty: 30 TABLET | Refills: 0 | Status: SHIPPED | OUTPATIENT
Start: 2025-08-25 | End: 2025-09-05

## 2025-08-25 RX ORDER — ONDANSETRON 4 MG/1
4 TABLET, FILM COATED ORAL EVERY 6 HOURS PRN
Qty: 30 TABLET | Refills: 0 | Status: SHIPPED | OUTPATIENT
Start: 2025-08-25

## 2025-08-25 RX ORDER — OXYCODONE AND ACETAMINOPHEN 5; 325 MG/1; MG/1
1 TABLET ORAL EVERY 6 HOURS PRN
Qty: 14 TABLET | Refills: 0 | Status: SHIPPED | OUTPATIENT
Start: 2025-08-25

## 2025-08-26 ENCOUNTER — ANESTHESIA (OUTPATIENT)
Dept: SURGERY | Facility: HOSPITAL | Age: 50
End: 2025-08-26
Payer: COMMERCIAL

## 2025-08-26 ENCOUNTER — HOSPITAL ENCOUNTER (OUTPATIENT)
Facility: HOSPITAL | Age: 50
Discharge: HOME | End: 2025-08-26
Attending: ORTHOPAEDIC SURGERY | Admitting: ORTHOPAEDIC SURGERY
Payer: COMMERCIAL

## 2025-08-26 DIAGNOSIS — M70.22 OLECRANON BURSITIS OF LEFT ELBOW: Primary | ICD-10-CM

## 2025-08-26 DIAGNOSIS — Z01.818 PRE-OP TESTING: ICD-10-CM

## 2025-08-26 PROCEDURE — 25000003 PHARM REV CODE 250: Performed by: ORTHOPAEDIC SURGERY

## 2025-08-26 PROCEDURE — 63600175 PHARM REV CODE 636 W HCPCS: Performed by: NURSE ANESTHETIST, CERTIFIED REGISTERED

## 2025-08-26 PROCEDURE — 63600175 PHARM REV CODE 636 W HCPCS: Performed by: ORTHOPAEDIC SURGERY

## 2025-08-26 PROCEDURE — 71000015 HC POSTOP RECOV 1ST HR: Performed by: ORTHOPAEDIC SURGERY

## 2025-08-26 PROCEDURE — 36000706: Performed by: ORTHOPAEDIC SURGERY

## 2025-08-26 PROCEDURE — 25000003 PHARM REV CODE 250: Performed by: ANESTHESIOLOGY

## 2025-08-26 PROCEDURE — 71000039 HC RECOVERY, EACH ADD'L HOUR: Performed by: ORTHOPAEDIC SURGERY

## 2025-08-26 PROCEDURE — 27200651 HC AIRWAY, LMA: Performed by: NURSE ANESTHETIST, CERTIFIED REGISTERED

## 2025-08-26 PROCEDURE — 36000707: Performed by: ORTHOPAEDIC SURGERY

## 2025-08-26 PROCEDURE — 71000033 HC RECOVERY, INTIAL HOUR: Performed by: ORTHOPAEDIC SURGERY

## 2025-08-26 PROCEDURE — 24147 PRTL EXC BONE OLECRN PROCESS: CPT | Mod: LT,,, | Performed by: ORTHOPAEDIC SURGERY

## 2025-08-26 PROCEDURE — 63600175 PHARM REV CODE 636 W HCPCS: Mod: JZ,TB | Performed by: NURSE ANESTHETIST, CERTIFIED REGISTERED

## 2025-08-26 PROCEDURE — 37000009 HC ANESTHESIA EA ADD 15 MINS: Performed by: ORTHOPAEDIC SURGERY

## 2025-08-26 PROCEDURE — 37000008 HC ANESTHESIA 1ST 15 MINUTES: Performed by: ORTHOPAEDIC SURGERY

## 2025-08-26 PROCEDURE — 94799 UNLISTED PULMONARY SVC/PX: CPT

## 2025-08-26 RX ORDER — MIDAZOLAM HYDROCHLORIDE 1 MG/ML
INJECTION INTRAMUSCULAR; INTRAVENOUS
Status: DISCONTINUED | OUTPATIENT
Start: 2025-08-26 | End: 2025-08-26

## 2025-08-26 RX ORDER — FENTANYL CITRATE 50 UG/ML
INJECTION, SOLUTION INTRAMUSCULAR; INTRAVENOUS
Status: DISCONTINUED | OUTPATIENT
Start: 2025-08-26 | End: 2025-08-26

## 2025-08-26 RX ORDER — LIDOCAINE HYDROCHLORIDE 20 MG/ML
INJECTION INTRAVENOUS
Status: DISCONTINUED | OUTPATIENT
Start: 2025-08-26 | End: 2025-08-26

## 2025-08-26 RX ORDER — DEXAMETHASONE SODIUM PHOSPHATE 4 MG/ML
INJECTION, SOLUTION INTRA-ARTICULAR; INTRALESIONAL; INTRAMUSCULAR; INTRAVENOUS; SOFT TISSUE
Status: DISCONTINUED | OUTPATIENT
Start: 2025-08-26 | End: 2025-08-26

## 2025-08-26 RX ORDER — HYDROCODONE BITARTRATE AND ACETAMINOPHEN 5; 325 MG/1; MG/1
1 TABLET ORAL EVERY 4 HOURS PRN
Refills: 0 | Status: CANCELLED | OUTPATIENT
Start: 2025-08-26

## 2025-08-26 RX ORDER — FENTANYL CITRATE 50 UG/ML
25-200 INJECTION, SOLUTION INTRAMUSCULAR; INTRAVENOUS
Status: DISCONTINUED | OUTPATIENT
Start: 2025-08-26 | End: 2025-08-26 | Stop reason: HOSPADM

## 2025-08-26 RX ORDER — MUPIROCIN 20 MG/G
OINTMENT TOPICAL
Status: DISCONTINUED | OUTPATIENT
Start: 2025-08-26 | End: 2025-08-26 | Stop reason: HOSPADM

## 2025-08-26 RX ORDER — KETOROLAC TROMETHAMINE 30 MG/ML
INJECTION, SOLUTION INTRAMUSCULAR; INTRAVENOUS
Status: DISCONTINUED | OUTPATIENT
Start: 2025-08-26 | End: 2025-08-26

## 2025-08-26 RX ORDER — ACETAMINOPHEN 10 MG/ML
INJECTION, SOLUTION INTRAVENOUS
Status: DISCONTINUED | OUTPATIENT
Start: 2025-08-26 | End: 2025-08-26

## 2025-08-26 RX ORDER — FENTANYL CITRATE 50 UG/ML
25 INJECTION, SOLUTION INTRAMUSCULAR; INTRAVENOUS EVERY 5 MIN PRN
Status: DISCONTINUED | OUTPATIENT
Start: 2025-08-26 | End: 2025-08-26 | Stop reason: HOSPADM

## 2025-08-26 RX ORDER — MIDAZOLAM HYDROCHLORIDE 1 MG/ML
2 INJECTION, SOLUTION INTRAMUSCULAR; INTRAVENOUS
Status: DISCONTINUED | OUTPATIENT
Start: 2025-08-26 | End: 2025-08-26 | Stop reason: HOSPADM

## 2025-08-26 RX ORDER — CEFAZOLIN 2 G/1
2 INJECTION, POWDER, FOR SOLUTION INTRAMUSCULAR; INTRAVENOUS
Status: COMPLETED | OUTPATIENT
Start: 2025-08-26 | End: 2025-08-26

## 2025-08-26 RX ORDER — PROPOFOL 10 MG/ML
VIAL (ML) INTRAVENOUS
Status: DISCONTINUED | OUTPATIENT
Start: 2025-08-26 | End: 2025-08-26

## 2025-08-26 RX ORDER — LIDOCAINE HYDROCHLORIDE 10 MG/ML
1 INJECTION, SOLUTION EPIDURAL; INFILTRATION; INTRACAUDAL; PERINEURAL ONCE
Status: DISCONTINUED | OUTPATIENT
Start: 2025-08-26 | End: 2025-08-26 | Stop reason: HOSPADM

## 2025-08-26 RX ORDER — ONDANSETRON HYDROCHLORIDE 2 MG/ML
INJECTION, SOLUTION INTRAVENOUS
Status: DISCONTINUED | OUTPATIENT
Start: 2025-08-26 | End: 2025-08-26

## 2025-08-26 RX ORDER — ONDANSETRON HYDROCHLORIDE 2 MG/ML
4 INJECTION, SOLUTION INTRAVENOUS ONCE AS NEEDED
Status: DISCONTINUED | OUTPATIENT
Start: 2025-08-26 | End: 2025-08-26 | Stop reason: HOSPADM

## 2025-08-26 RX ORDER — OXYCODONE HYDROCHLORIDE 5 MG/1
5 TABLET ORAL ONCE AS NEEDED
Status: COMPLETED | OUTPATIENT
Start: 2025-08-26 | End: 2025-08-26

## 2025-08-26 RX ADMIN — MIDAZOLAM HYDROCHLORIDE 2 MG: 1 INJECTION, SOLUTION INTRAMUSCULAR; INTRAVENOUS at 12:08

## 2025-08-26 RX ADMIN — FENTANYL CITRATE 100 MCG: 50 INJECTION, SOLUTION INTRAMUSCULAR; INTRAVENOUS at 12:08

## 2025-08-26 RX ADMIN — OXYCODONE HYDROCHLORIDE 5 MG: 5 TABLET ORAL at 02:08

## 2025-08-26 RX ADMIN — ONDANSETRON 8 MG: 2 INJECTION INTRAMUSCULAR; INTRAVENOUS at 01:08

## 2025-08-26 RX ADMIN — SODIUM CHLORIDE, SODIUM GLUCONATE, SODIUM ACETATE, POTASSIUM CHLORIDE AND MAGNESIUM CHLORIDE: 526; 502; 368; 37; 30 INJECTION, SOLUTION INTRAVENOUS at 11:08

## 2025-08-26 RX ADMIN — PROPOFOL 200 MG: 10 INJECTION, EMULSION INTRAVENOUS at 12:08

## 2025-08-26 RX ADMIN — ACETAMINOPHEN 1000 MG: 10 INJECTION INTRAVENOUS at 01:08

## 2025-08-26 RX ADMIN — KETOROLAC TROMETHAMINE 30 MG: 30 INJECTION, SOLUTION INTRAMUSCULAR; INTRAVENOUS at 01:08

## 2025-08-26 RX ADMIN — MUPIROCIN 1 G: 20 OINTMENT TOPICAL at 11:08

## 2025-08-26 RX ADMIN — DEXAMETHASONE SODIUM PHOSPHATE 4 MG: 4 INJECTION, SOLUTION INTRA-ARTICULAR; INTRALESIONAL; INTRAMUSCULAR; INTRAVENOUS; SOFT TISSUE at 01:08

## 2025-08-26 RX ADMIN — CEFAZOLIN 2 G: 2 INJECTION, POWDER, FOR SOLUTION INTRAMUSCULAR; INTRAVENOUS at 12:08

## 2025-08-26 RX ADMIN — LIDOCAINE HYDROCHLORIDE 100 MG: 20 INJECTION, SOLUTION INTRAVENOUS at 12:08

## 2025-08-27 VITALS
BODY MASS INDEX: 30.8 KG/M2 | RESPIRATION RATE: 18 BRPM | OXYGEN SATURATION: 100 % | HEIGHT: 74 IN | DIASTOLIC BLOOD PRESSURE: 82 MMHG | SYSTOLIC BLOOD PRESSURE: 124 MMHG | WEIGHT: 240 LBS | HEART RATE: 68 BPM | TEMPERATURE: 98 F

## (undated) DEVICE — DRAPE C ARM 42 X 120 10/BX

## (undated) DEVICE — SPONGE BULKEE II ABSRB 6X6.75

## (undated) DEVICE — PADDING CAST 4IN SPECIALIST

## (undated) DEVICE — DRAPE STERI INSTRUMENT 1018

## (undated) DEVICE — GLOVE SENSICARE PI GRN 8

## (undated) DEVICE — CLOSURE SKIN STERI STRIP 1/2X4

## (undated) DEVICE — BNDG COFLEX FOAM LF2 ST 4X5YD

## (undated) DEVICE — BANDAGE ESMARK ELASTIC ST 4X9

## (undated) DEVICE — SUT 2-0 VICRYL / CT-1

## (undated) DEVICE — SUT STRATAFIX SPRL PS-2 3-0

## (undated) DEVICE — DRAPE THREE-QTR REINF 53X77IN

## (undated) DEVICE — PAD ABDOMINAL STERILE 8X10IN

## (undated) DEVICE — DRAPE U SPLIT SHEET 54X76IN

## (undated) DEVICE — Device

## (undated) DEVICE — PACK CUSTOM UNIV BASIN SLI

## (undated) DEVICE — DRESSING N ADH OIL EMUL 3X3

## (undated) DEVICE — YANKAUER OPEN TIP W/O VENT

## (undated) DEVICE — STRAP OR TABLE 5IN X 72IN

## (undated) DEVICE — ADHESIVE DERMABOND ADVANCED

## (undated) DEVICE — GOWN POLY REINF X-LONG XL

## (undated) DEVICE — GLOVE SENSICARE PI ALOE 7.5

## (undated) DEVICE — SLEEVE SCD EXPRESS KNEE MEDIUM

## (undated) DEVICE — TOURNIQUET SB QC DP 18X4IN

## (undated) DEVICE — GOWN POLY REINF BRTH SLV XL

## (undated) DEVICE — PAD CAST SPECIALIST STRL 4

## (undated) DEVICE — PACK SIRUS BASIC V SURG STRL

## (undated) DEVICE — LINER SUCTION 3000CC

## (undated) DEVICE — ADHESIVE MASTISOL VIAL 48/BX

## (undated) DEVICE — CORD BIPOLAR 12 FOOT

## (undated) DEVICE — PENCIL SMK EVAC CONNECTOR 10FT

## (undated) DEVICE — SOCKINETTE IMPERVIOUS 12X48IN

## (undated) DEVICE — ALCOHOL 70% ANTISEPTIC ISO 4OZ

## (undated) DEVICE — ELECTRODE MEGADYNE RETURN DUAL

## (undated) DEVICE — TOWEL OR DISP STRL BLUE 4/PK

## (undated) DEVICE — SLING ORTHOPEDIC MEDIUM

## (undated) DEVICE — SUT CTD VICRYL 0 UND BR

## (undated) DEVICE — DRAPE HAND STERILE

## (undated) DEVICE — BLADE SURG #15 CARBON STEEL

## (undated) DEVICE — SUCTION FRAZIER TIP SURG 12FR

## (undated) DEVICE — SOL NACL IRR 1000ML BTL